# Patient Record
Sex: MALE | Race: BLACK OR AFRICAN AMERICAN | Employment: FULL TIME | URBAN - METROPOLITAN AREA
[De-identification: names, ages, dates, MRNs, and addresses within clinical notes are randomized per-mention and may not be internally consistent; named-entity substitution may affect disease eponyms.]

---

## 2017-02-15 ENCOUNTER — HOSPITAL ENCOUNTER (OUTPATIENT)
Dept: GENERAL RADIOLOGY | Age: 37
Discharge: HOME | End: 2017-02-15
Payer: COMMERCIAL

## 2017-02-15 DIAGNOSIS — Z00.00 ROUTINE GENERAL MEDICAL EXAMINATION AT A HEALTH CARE FACILITY: ICD-10-CM

## 2017-02-15 PROCEDURE — 71020 XR CHEST PA LAT: CPT

## 2017-03-07 ENCOUNTER — OFFICE VISIT (OUTPATIENT)
Dept: ORTHOPEDIC SURGERY | Age: 37
End: 2017-03-07

## 2017-03-07 VITALS
SYSTOLIC BLOOD PRESSURE: 141 MMHG | DIASTOLIC BLOOD PRESSURE: 99 MMHG | WEIGHT: 227 LBS | HEART RATE: 70 BPM | TEMPERATURE: 98 F

## 2017-03-07 DIAGNOSIS — M25.512 LEFT SHOULDER PAIN, UNSPECIFIED CHRONICITY: ICD-10-CM

## 2017-03-07 DIAGNOSIS — M75.122 COMPLETE ROTATOR CUFF TEAR OF LEFT SHOULDER: ICD-10-CM

## 2017-03-07 DIAGNOSIS — M19.012 PRIMARY OSTEOARTHRITIS OF LEFT SHOULDER: Primary | ICD-10-CM

## 2017-03-07 RX ORDER — MELOXICAM 15 MG/1
15 TABLET ORAL
Qty: 30 TAB | Refills: 1 | Status: SHIPPED | OUTPATIENT
Start: 2017-03-07 | End: 2018-11-06 | Stop reason: SDUPTHER

## 2017-03-07 RX ORDER — LOSARTAN POTASSIUM AND HYDROCHLOROTHIAZIDE 25; 100 MG/1; MG/1
1 TABLET ORAL DAILY
COMMUNITY
End: 2021-06-23

## 2017-03-07 RX ORDER — FAMOTIDINE 40 MG/1
40 TABLET, FILM COATED ORAL DAILY
COMMUNITY
End: 2021-06-23

## 2017-03-07 NOTE — PROGRESS NOTES
Selina Dobbins  1980   Chief Complaint   Patient presents with    Shoulder Pain     Left    Hand Pain     Left        HISTORY OF PRESENT ILLNESS  Selina Dobbins is a 31 y.o. male who presents today for evaluation of left shoulder pain. He rates his pain 10/10 today. He recalls wrestling several years ago and feeling a \"dislocating\" sensation. E states he has trouble sleeping at night due to the pain. He states when he throws a balls he feels his shoulder pop out. He reports having about 10 episodes of this. He works as a  and states he has to do a lot of lifting. Patient was referred to me by Dr. Yoni Gonzalez for further evaluation. No Known Allergies     History reviewed. No pertinent past medical history. Social History     Social History    Marital status: UNKNOWN     Spouse name: N/A    Number of children: N/A    Years of education: N/A     Occupational History    Not on file. Social History Main Topics    Smoking status: Heavy Tobacco Smoker     Packs/day: 0.50    Smokeless tobacco: Not on file    Alcohol use Not on file    Drug use: Not on file    Sexual activity: Not on file     Other Topics Concern    Not on file     Social History Narrative      History reviewed. No pertinent surgical history. History reviewed. No pertinent family history. Current Outpatient Prescriptions   Medication Sig    losartan-hydroCHLOROthiazide (HYZAAR) 100-25 mg per tablet Take 1 Tab by mouth daily.  famotidine (PEPCID) 40 mg tablet Take 40 mg by mouth daily.  ranitidine (ZANTAC) 150 mg tablet Take 150 mg by mouth two (2) times a day. No current facility-administered medications for this visit. REVIEW OF SYSTEM   Patient denies: Weight loss, Fever/Chills, HA, Visual changes, Fatigue, Chest pain, SOB, Abdominal pain, N/V/D/C, Blood in stool or urine, Edema. Pertinent positive as above in HPI.  All others were negative    PHYSICAL EXAM:   Visit Vitals    BP (!) 141/99 (BP 1 Location: Left arm, BP Patient Position: Sitting)    Pulse 70    Temp 98 °F (36.7 °C) (Oral)    Wt 227 lb (103 kg)     The patient is a well-developed, well-nourished male   in no acute distress. The patient is alert and oriented times three. The patient is alert and oriented times three. Mood and affect are normal.  LYMPHATIC: lymph nodes are not enlarged and are within normal limits  SKIN: normal in color and non tender to palpation. There are no bruises or abrasions noted. NEUROLOGICAL: Motor sensory exam is within normal limits. Reflexes are equal bilaterally. There is normal sensation to pinprick and light touch  MUSCULOSKELETAL:  Examination Left shoulder   Skin Intact   AC joint tenderness -   Biceps tenderness -   Forward flexion/Elevation    Active abduction    Glenohumeral abduction 90   External rotation ROM 60   Internal rotation ROM 30   Apprehension +   Chriss Relocation -   Jerk -   Load and Shift +   Obriens -   Speeds -   Impingement sign -   Supraspinatus/Empty Can -, 5/5   External Rotation Strength -, 5/5   Lift Off/Belly Press -, 5/5   Neurovascular Intact          IMAGING: XR of the left shoulder dated 3/7/17 was reviewed and read:  Small inferior spur in the humeral head with moderate degenerative changes in the glenohumeral joint. IMPRESSION:      ICD-10-CM ICD-9-CM    1. Primary osteoarthritis of left shoulder M19.012 715.11    2. Left shoulder pain, unspecified chronicity M25.512 719.41 AMB POC XRAY, SHOULDER; COMPLETE, 2+        PLAN: I discussed the treatment options with the patient. The challenge is that he has had some instability in the past, but he already had degenerative arthritis in the shoulder with an abnormality in the glenohumeral joint, so I am not sure whether we will be addressing the instability now. We will proceed with an MRI arthrogram to assess the left shoulder. He will also be given a prescription for Mobic.  I will see him back following completion of the MRI. Office note will be sent to the referring provider.   Follow-up Disposition: Not on File    Scribed by Doreen Franco [facility]) as dictated by MD Braxton Bernard M.D.   [facility]

## 2017-03-24 ENCOUNTER — HOSPITAL ENCOUNTER (OUTPATIENT)
Dept: MRI IMAGING | Age: 37
Discharge: HOME | End: 2017-03-24
Attending: ORTHOPAEDIC SURGERY
Payer: COMMERCIAL

## 2017-03-24 ENCOUNTER — HOSPITAL ENCOUNTER (OUTPATIENT)
Dept: GENERAL RADIOLOGY | Age: 37
Discharge: HOME | End: 2017-03-24
Attending: ORTHOPAEDIC SURGERY
Payer: COMMERCIAL

## 2017-03-24 DIAGNOSIS — M75.122 COMPLETE ROTATOR CUFF TEAR OF LEFT SHOULDER: ICD-10-CM

## 2017-03-24 PROCEDURE — 74011000250 HC RX REV CODE- 250: Performed by: ORTHOPAEDIC SURGERY

## 2017-03-24 PROCEDURE — 77002 NEEDLE LOCALIZATION BY XRAY: CPT

## 2017-03-24 PROCEDURE — 74011636320 HC RX REV CODE- 636/320: Performed by: ORTHOPAEDIC SURGERY

## 2017-03-24 PROCEDURE — A9579 GAD-BASE MR CONTRAST NOS,1ML: HCPCS | Performed by: ORTHOPAEDIC SURGERY

## 2017-03-24 PROCEDURE — 73222 MRI JOINT UPR EXTREM W/DYE: CPT

## 2017-03-24 RX ORDER — LIDOCAINE HYDROCHLORIDE 10 MG/ML
30 INJECTION, SOLUTION EPIDURAL; INFILTRATION; INTRACAUDAL; PERINEURAL
Status: COMPLETED | OUTPATIENT
Start: 2017-03-24 | End: 2017-03-24

## 2017-03-24 RX ORDER — SODIUM CHLORIDE 9 MG/ML
20 INJECTION INTRAMUSCULAR; INTRAVENOUS; SUBCUTANEOUS
Status: COMPLETED | OUTPATIENT
Start: 2017-03-24 | End: 2017-03-24

## 2017-03-24 RX ADMIN — LIDOCAINE HYDROCHLORIDE 6 ML: 10 INJECTION, SOLUTION EPIDURAL; INFILTRATION; INTRACAUDAL; PERINEURAL at 11:00

## 2017-03-24 RX ADMIN — GADOPENTETATE DIMEGLUMINE 0.1 ML: 469.01 INJECTION INTRAVENOUS at 11:00

## 2017-03-24 RX ADMIN — IOPAMIDOL 4 ML: 612 INJECTION, SOLUTION INTRATHECAL at 11:00

## 2017-03-24 RX ADMIN — SODIUM CHLORIDE 12 ML: 9 INJECTION, SOLUTION INTRAMUSCULAR; INTRAVENOUS; SUBCUTANEOUS at 11:00

## 2017-05-03 ENCOUNTER — OFFICE VISIT (OUTPATIENT)
Dept: ORTHOPEDIC SURGERY | Age: 37
End: 2017-05-03

## 2017-05-03 VITALS
SYSTOLIC BLOOD PRESSURE: 150 MMHG | BODY MASS INDEX: 33.62 KG/M2 | HEART RATE: 58 BPM | WEIGHT: 227 LBS | TEMPERATURE: 97.8 F | DIASTOLIC BLOOD PRESSURE: 85 MMHG | HEIGHT: 69 IN

## 2017-05-03 DIAGNOSIS — M21.822 HILL-SACHS LESION OF LEFT SHOULDER: ICD-10-CM

## 2017-05-03 DIAGNOSIS — M19.012 PRIMARY OSTEOARTHRITIS OF LEFT SHOULDER: Primary | ICD-10-CM

## 2017-05-03 DIAGNOSIS — M54.5 ACUTE LOW BACK PAIN, UNSPECIFIED BACK PAIN LATERALITY, WITH SCIATICA PRESENCE UNSPECIFIED: ICD-10-CM

## 2017-05-03 NOTE — PROGRESS NOTES
Karlee Ordaz  1980   Chief Complaint   Patient presents with    Follow-up     left shoulder, MRI results        HISTORY OF PRESENT ILLNESS  Karlee Ordaz is a 31 y.o. male who presents today for reevaluation of left shoulder pain and to review his MRI results. He rates his pain 0/10 today. He describes the pain as intermittent. He recalls wrestling several years ago and feeling a \"dislocating\" sensation. He states he has trouble sleeping at night due to the pain. He states when he throws balls he feels his shoulder pop out. He reports having about 10 episodes of this. He works as a  and states he has to do a lot of lifting. Patient denies any fever, chills, chest pain, shortness of breath or calf pain. There are no new illness or injuries to report since last seen in the office. There are no changes to medications, allergies, family or social history. PHYSICAL EXAM:   Visit Vitals    /85    Pulse (!) 58    Temp 97.8 °F (36.6 °C) (Oral)    Ht 5' 9\" (1.753 m)    Wt 227 lb (103 kg)    BMI 33.52 kg/m2     The patient is a well-developed, well-nourished male   in no acute distress. The patient is alert and oriented times three. The patient is alert and oriented times three. Mood and affect are normal.  LYMPHATIC: lymph nodes are not enlarged and are within normal limits  SKIN: normal in color and non tender to palpation. There are no bruises or abrasions noted. NEUROLOGICAL: Motor sensory exam is within normal limits. Reflexes are equal bilaterally. There is normal sensation to pinprick and light touch  MUSCULOSKELETAL:  unchanged    IMAGING:   MRI of the left shoulder with contrast dated 3/24/17 was reviewed and read: Impression:  1. Sequelae of remote anterior shoulder dislocation with mild Hill-Sachs lesion  and small displaced osseous Bankart. 2. Mild glenohumeral osteoarthrosis.   3. Undersurface fraying of the supraspinatus and infraspinatus tendon at the  Hill-Sachs lesion but otherwise no evidence for tendon tear. XR of the left shoulder dated 3/7/17 was reviewed and read:  Small inferior spur in the humeral head with moderate degenerative changes in the glenohumeral joint. IMPRESSION:      ICD-10-CM ICD-9-CM    1. Primary osteoarthritis of left shoulder M19.012 715.11    2. Hill-Sachs lesion of left shoulder M21.822 736.89    3. Acute low back pain, unspecified back pain laterality, with sciatica presence unspecified M54.5 724.2 REFERRAL TO SPINE SURGERY        PLAN: I discussed the results of the MRI arthrogram and the treatment options with the patient. MRI shows a mild Hill-Sachs lesion as well as OA. He no longer has instability symptoms and his pain is under control. So, any further surgical options are not on the table at this point. Patient had the additional complaint of back pain. He will be referred to the spine center. I instructed him to continue his activities with his shoulder as tolerated. He will return to me as needed. Follow-up Disposition: Not on File    Scribed by Aneudy Contreras [facility]) as dictated by Go Pacheco MD    I, Dr. Go Pacheco, confirm that all documentation is accurate.     Go Pacheco M.D.   [facility]

## 2017-05-12 ENCOUNTER — APPOINTMENT (OUTPATIENT)
Dept: GENERAL RADIOLOGY | Age: 37
End: 2017-05-12
Attending: PHYSICIAN ASSISTANT
Payer: COMMERCIAL

## 2017-05-12 ENCOUNTER — HOSPITAL ENCOUNTER (EMERGENCY)
Age: 37
Discharge: HOME | End: 2017-05-12
Attending: EMERGENCY MEDICINE
Payer: COMMERCIAL

## 2017-05-12 VITALS
HEIGHT: 69 IN | TEMPERATURE: 99.5 F | HEART RATE: 58 BPM | WEIGHT: 227 LBS | BODY MASS INDEX: 33.62 KG/M2 | SYSTOLIC BLOOD PRESSURE: 159 MMHG | OXYGEN SATURATION: 100 % | DIASTOLIC BLOOD PRESSURE: 87 MMHG | RESPIRATION RATE: 16 BRPM

## 2017-05-12 DIAGNOSIS — M54.32 SCIATICA OF LEFT SIDE: Primary | ICD-10-CM

## 2017-05-12 DIAGNOSIS — M51.36 DEGENERATIVE DISC DISEASE, LUMBAR: ICD-10-CM

## 2017-05-12 DIAGNOSIS — I10 ESSENTIAL HYPERTENSION: ICD-10-CM

## 2017-05-12 PROCEDURE — 72100 X-RAY EXAM L-S SPINE 2/3 VWS: CPT

## 2017-05-12 PROCEDURE — 74011636637 HC RX REV CODE- 636/637: Performed by: PHYSICIAN ASSISTANT

## 2017-05-12 PROCEDURE — 99283 EMERGENCY DEPT VISIT LOW MDM: CPT

## 2017-05-12 PROCEDURE — 74011250637 HC RX REV CODE- 250/637: Performed by: PHYSICIAN ASSISTANT

## 2017-05-12 RX ORDER — METHOCARBAMOL 500 MG/1
500 TABLET, FILM COATED ORAL 4 TIMES DAILY
Status: DISCONTINUED | OUTPATIENT
Start: 2017-05-12 | End: 2017-05-12

## 2017-05-12 RX ORDER — PREDNISONE 20 MG/1
60 TABLET ORAL
Status: COMPLETED | OUTPATIENT
Start: 2017-05-12 | End: 2017-05-12

## 2017-05-12 RX ORDER — METHOCARBAMOL 500 MG/1
500 TABLET, FILM COATED ORAL
Status: DISCONTINUED | OUTPATIENT
Start: 2017-05-12 | End: 2017-05-12 | Stop reason: HOSPADM

## 2017-05-12 RX ORDER — PREDNISONE 20 MG/1
TABLET ORAL
Qty: 12 TAB | Refills: 0 | Status: SHIPPED | OUTPATIENT
Start: 2017-05-12 | End: 2017-05-23 | Stop reason: ALTCHOICE

## 2017-05-12 RX ORDER — METHOCARBAMOL 500 MG/1
500 TABLET, FILM COATED ORAL 3 TIMES DAILY
Qty: 20 TAB | Refills: 0 | Status: SHIPPED | OUTPATIENT
Start: 2017-05-12 | End: 2021-06-23

## 2017-05-12 RX ORDER — HYDROCODONE BITARTRATE AND ACETAMINOPHEN 5; 325 MG/1; MG/1
1 TABLET ORAL
Status: COMPLETED | OUTPATIENT
Start: 2017-05-12 | End: 2017-05-12

## 2017-05-12 RX ADMIN — HYDROCODONE BITARTRATE AND ACETAMINOPHEN 1 TABLET: 5; 325 TABLET ORAL at 16:58

## 2017-05-12 RX ADMIN — METHOCARBAMOL 500 MG: 500 TABLET ORAL at 17:25

## 2017-05-12 RX ADMIN — PREDNISONE 60 MG: 20 TABLET ORAL at 17:25

## 2017-05-12 NOTE — LETTER
NOTIFICATION RETURN TO WORK / SCHOOL 
 
5/12/2017 7:11 PM 
 
Mr. Regino Mcardle [address] [place] [id number] To Whom It May Concern: 
 
Regino Mcardle is currently under the care of [facility]. He will return to work/school on: 5/17/17. If there are questions or concerns please have the patient contact our office.  
 
 
 
Sincerely, 
 
 
Yesi Han PA-C

## 2017-05-12 NOTE — ED PROVIDER NOTES
HPI Comments: 4:46 PM Tejinder Cook is a 31 y.o. male who presents to ED to be evaluated for lower back pain onset about three weeks ago. Pt explains he has been experiencing intermittent lower back pain for three weeks but it worsened today and began travelling down his L leg. States he was \"standing doing nothing\" when the pain became worse. Pt took an 800 mg Ibuprofen today with no relief. Pt works at a warehouse stocking inventory. Denies fever, chills, urinary symptoms, injury or fall. No other concerns at this time. The history is provided by the patient. No past medical history on file. No past surgical history on file. No family history on file. Social History     Social History    Marital status: UNKNOWN     Spouse name: N/A    Number of children: N/A    Years of education: N/A     Occupational History    Not on file. Social History Main Topics    Smoking status: Current Some Day Smoker     Packs/day: 0.50    Smokeless tobacco: Never Used    Alcohol use Yes    Drug use: No    Sexual activity: Not on file     Other Topics Concern    Not on file     Social History Narrative         ALLERGIES: Review of patient's allergies indicates no known allergies. Review of Systems   Constitutional: Negative for activity change, chills and fever. HENT: Negative for congestion and sore throat. Eyes: Negative. Respiratory: Negative for cough and shortness of breath. Cardiovascular: Negative for chest pain. Gastrointestinal: Negative for abdominal pain, constipation, diarrhea, nausea and vomiting. Genitourinary: Negative for difficulty urinating, dysuria, flank pain, frequency, hematuria and testicular pain. Musculoskeletal: Positive for back pain (Lower). Negative for neck pain and neck stiffness. Skin: Negative. Neurological: Negative for dizziness. Psychiatric/Behavioral: Negative for confusion. All other systems reviewed and are negative.       There were no vitals filed for this visit. Physical Exam   Constitutional: He is oriented to person, place, and time. He appears well-developed and well-nourished. No distress. HENT:   Head: Normocephalic and atraumatic. Right Ear: External ear normal.   Left Ear: External ear normal.   Nose: Nose normal.   Mouth/Throat: Oropharynx is clear and moist.   Eyes: Conjunctivae and EOM are normal.   Neck: Normal range of motion. Neck supple. Cardiovascular: Normal rate, regular rhythm, normal heart sounds and intact distal pulses. Pulmonary/Chest: Effort normal and breath sounds normal.   Abdominal: Soft. Bowel sounds are normal. There is no tenderness. Negative CVA tenderness bilaterally. Musculoskeletal: Normal range of motion. He exhibits tenderness (paraspinal lumbar, no midline tenderness, pt says movement during exam hurts his low back. sitting straight leg raise, positive on left, negative on right. ). He exhibits no edema or deformity. Neurological: He is alert and oriented to person, place, and time. He exhibits normal muscle tone. Coordination normal.   Reflexes symmetric 1+ bilateral patella. Skin: Skin is warm and dry. No rash noted. He is not diaphoretic. No erythema. No pallor. Psychiatric: He has a normal mood and affect. His behavior is normal.   Nursing note and vitals reviewed. MDM  Number of Diagnoses or Management Options  Degenerative disc disease, lumbar:   Essential hypertension:   Sciatica of left side:      Amount and/or Complexity of Data Reviewed  Tests in the radiology section of CPT®: ordered and reviewed (Viewed by me, no fx, severe DDD L5S1. )    Risk of Complications, Morbidity, and/or Mortality  Presenting problems: moderate  Diagnostic procedures: moderate  Management options: moderate  General comments: No cauda equina or other emergencies today, pt is ambulatory with some pain. Patient Progress  Patient progress: stable (Improved.  )    ED Course Procedures      ICD-10-CM ICD-9-CM   1. Sciatica of left side M54.32 724.3   2. Degenerative disc disease, lumbar M51.36 722.52   3. Essential hypertension I10 401.9     Plan: discharge to home, see ortho and pcp discuss MRI lumbar spine, robaxin and prednisone taper for now, return here for any concerns. Scribe Attestation:   Alysa Gonzalez, sudhaibing for and in the presence of Jennifer Hugo May 12, 2017 at 4:48 PM     Physician Assistant Attestation:   I personally performed the services described in this documentation, reviewed and edited the documentation which was dictated to the scribe in my presence, and it accurately records my words and actions.  Jennifer Hugo  May 12, 2017 at 4:48 PM    Signed by: Heidi Mahoney May 12, 2017 at 4:48 PM

## 2017-05-23 ENCOUNTER — OFFICE VISIT (OUTPATIENT)
Dept: ORTHOPEDIC SURGERY | Age: 37
End: 2017-05-23

## 2017-05-23 VITALS
BODY MASS INDEX: 33.62 KG/M2 | WEIGHT: 227 LBS | HEART RATE: 60 BPM | HEIGHT: 69 IN | TEMPERATURE: 98.2 F | DIASTOLIC BLOOD PRESSURE: 78 MMHG | SYSTOLIC BLOOD PRESSURE: 145 MMHG | RESPIRATION RATE: 20 BRPM

## 2017-05-23 DIAGNOSIS — M54.16 LUMBAR RADICULOPATHY: Primary | ICD-10-CM

## 2017-05-23 DIAGNOSIS — M79.18 MYOFASCIAL PAIN: ICD-10-CM

## 2017-05-23 RX ORDER — DICLOFENAC SODIUM 75 MG/1
75 TABLET, DELAYED RELEASE ORAL
Qty: 60 TAB | Refills: 5 | Status: SHIPPED | OUTPATIENT
Start: 2017-05-23 | End: 2018-11-06 | Stop reason: SDUPTHER

## 2017-05-23 NOTE — MR AVS SNAPSHOT
Visit Information Date & Time Provider Department Dept. Phone Encounter #  
 5/23/2017  8:45 AM Charlotte Badillo MD [facility] [phone] [id number] Follow-up Instructions Return in about 6 weeks (around 7/4/2017), or if symptoms worsen or fail to improve. Upcoming Health Maintenance Date Due Pneumococcal 19-64 Medium Risk (1 of 1 - PPSV23) 8/25/2001 DTaP/Tdap/Td series (1 - Tdap) 8/25/2003 INFLUENZA AGE 9 TO ADULT 8/1/2017 Allergies as of 5/23/2017  Review Complete On: 5/23/2017 By: Bozena Espino LPN No Known Allergies Current Immunizations  Never Reviewed No immunizations on file. Not reviewed this visit You Were Diagnosed With   
  
 Codes Comments Lumbar radiculopathy    -  Primary ICD-10-CM: M54.16 
ICD-9-CM: 724.4 Myofascial pain     ICD-10-CM: M79.1 ICD-9-CM: 729.1 Vitals BP Pulse Temp Resp Height(growth percentile) Weight(growth percentile) 145/78 60 98.2 °F (36.8 °C) (Oral) 20 5' 9\" (1.753 m) 227 lb (103 kg) BMI Smoking Status 33.52 kg/m2 Current Some Day Smoker BMI and BSA Data Body Mass Index Body Surface Area  
 33.52 kg/m 2 2.24 m 2 Preferred Pharmacy Pharmacy Name Phone [facility] [address] [place], [place] [address] Your Updated Medication List  
  
   
This list is accurate as of: 5/23/17  9:24 AM.  Always use your most recent med list.  
  
  
  
  
 diclofenac EC 75 mg EC tablet Commonly known as:  VOLTAREN Take 1 Tab by mouth two (2) times daily as needed. famotidine 40 mg tablet Commonly known as:  PEPCID Take 40 mg by mouth daily. losartan-hydroCHLOROthiazide 100-25 mg per tablet Commonly known as:  HYZAAR Take 1 Tab by mouth daily. meloxicam 15 mg tablet Commonly known as:  MOBIC Take 1 Tab by mouth daily (with breakfast). methocarbamol 500 mg tablet Commonly known as:  ROBAXIN Take 1 Tab by mouth three (3) times daily. raNITIdine 150 mg tablet Commonly known as:  ZANTAC Take 150 mg by mouth two (2) times a day. Prescriptions Sent to Pharmacy Refills  
 diclofenac EC (VOLTAREN) 75 mg EC tablet 5 Sig: Take 1 Tab by mouth two (2) times daily as needed. Class: Normal  
 Pharm[address]: [id number] [facility] [facility] #: [phone] Route: Oral  
  
We Performed the Following REFERRAL TO PHYSICAL THERAPY [RVP17 Custom] Comments:  
 eval and treat, Neto therapy, workplace conditioning; 
 
Patient has a lumbar radiculopathy Follow-up Instructions Return in about 6 weeks (around 7/4/2017), or if symptoms worsen or fail to improve. Referral Information Referral ID Referred By Referred To  
  
 [id number] JUAN Lew Not Available Visits Status Start Date End Date 1 New Request 5/23/17 5/23/18 If your referral has a status of pending review or denied, additional information will be sent to support the outcome of this decision. Patient Instructions Bulging Disc: Exercises Your Care Instructions Here are some examples of typical rehabilitation exercises for your condition. Start each exercise slowly. Ease off the exercise if you start to have pain. Your doctor or physical therapist will tell you when you can start these exercises and which ones will work best for you. How to do the exercises Note: These exercises can help you move easier and feel better. But when you first start doing them, you may have more pain in your back. This is normal. But it is important to pay close attention to your pain during and after each exercise. · Keep doing these exercises if your pain stays the same or moves from your leg and buttock more toward the middle of your spine.  Pain moving out of your leg and buttock is a good sign. · Stop doing these exercises if your pain gets worse in your leg and buttock. Stop if you start to have pain in your leg and buttock that you didn't have before. Be sure to do these exercises in the order they appear. Note how your pain changes before you move to the next one. If your pain is much worse right after exercise and stays worse the next day, do not do any of these exercises. 1. Rest on belly 1. Lie on your stomach, face down, with your head turned to the side. Place your arms beside your body. If this bothers your neck, place your hands, one on top of the other, underneath your forehead. This will help support your head and neck. 2. Try to relax your lower back muscles as much as you can. 3. Continue to lie on your stomach for 2 minutes. 4. If your pain spreads down your leg or increases down your leg, stop this exercise and do not do the next exercises. 2. Press-up 1. Lie on your stomach, face down. Keep your elbows tucked into your sides and under your shoulders. 2. Press your elbows down into the floor to raise your upper back. As you do this, relax your stomach muscles. Allow your back to arch without using your back muscles. Let your low back relax completely as you arch up. 3. Hold this position for 2 minutes. 4. Repeat 2 to 4 times. 5. If your pain spreads down your leg or increases down your leg, stop this exercise and do not do the next exercises. 3. Full press-up 1. Lie on your stomach, face down. Keep your elbows tucked into your sides and under your shoulders. 2. Straighten your elbows, and push your upper body up as far as you can. Allow your lower back to sag. Keep your hips, pelvis, and legs relaxed. 3. Hold this position for 5 seconds, and then relax. 4. Repeat 10 times. Each time, try to raise your upper body a little higher and hold your arms a bit straighter. 5. If your pain spreads down your leg or gets worse down your leg, stop this exercise and do not move to the next exercise. 6. If you can't do this exercise, you may instead try the backward bend exercise that follows. 4. Backward bend · Stand with your feet hip-width apart. Your toes should point forward. Do not lock your knees. · Place your hands in the small of your back. · Bend backward as far as you can, keeping your knees straight. Hold this position for 2 to 3 seconds. Then return to your starting position. · Repeat 2 to 4 times. Each time, try to bend backward a little farther, until you bend backward as far as you can. · If your pain spreads down your leg or increases down your leg, stop this exercise. Follow-up care is a key part of your treatment and safety. Be sure to make and go to all appointments, and call your doctor if you are having problems. It's also a good idea to know your test results and keep a list of the medicines you take. Where can you learn more? Go to http://chuy-jean.info/ Enter [phone] in the search box to learn more about \"Herniated Disc: Exercises. \" 
© 3921-0684 Healthwise, Incorporated. Care instructions adapted under license by [employer] (which disclaims liability or warranty for this information). This care instruction is for use with your licensed healthcare professional. If you have questions about a medical condition or this instruction, always ask your healthcare professional. [place] [address] any warranty or liability for your use of this information. Content Version: 17.5.808424; Current as of: May 22, 2015 Introducing [facility]! Deamargaret Reyes: Thank you for requesting a [employer] account. Our records indicate that you already have an active [employer] account. You can access your account anytime at https://[employer]. [employer] Did you know that you can access your hospital and ER discharge instructions at any time in [employer]? You can also review all of your test results from your hospital stay or ER visit. Additional Information If you have questions, please visit the Frequently Asked Questions section of the [employer] website at [email or web address] Remember, [employer] is NOT to be used for urgent needs. For medical emergencies, dial 911. Now available from your iPhone and Android! Please provide this summary of care documentation to your next provider. Your primary care clinician is listed as [address]. If you have any questions after today's visit, please call [phone].

## 2017-05-23 NOTE — LETTER
NOTIFICATION RETURN TO WORK 
 
5/23/2017 9:11 AM 
 
Mr. Sangeeta Easley [address] [place] [id number] To Whom It May Concern: 
 
Sangeeta Easley is currently under the care of [facility]. He was seen today on 5/23/2017. Mr. Hortensia Canela will return to work on medium duty with the following restriction: no lifting more than 30 lbs. He will return in 6 weeks for reevaluation. If there are questions or concerns please have the patient contact our office.  
 
 
 
Sincerely, 
 
 
Chery Milligan MD

## 2017-05-23 NOTE — PATIENT INSTRUCTIONS
Bulging Disc: Exercises  Your Care Instructions  Here are some examples of typical rehabilitation exercises for your condition. Start each exercise slowly. Ease off the exercise if you start to have pain. Your doctor or physical therapist will tell you when you can start these exercises and which ones will work best for you. How to do the exercises  Note: These exercises can help you move easier and feel better. But when you first start doing them, you may have more pain in your back. This is normal. But it is important to pay close attention to your pain during and after each exercise. · Keep doing these exercises if your pain stays the same or moves from your leg and buttock more toward the middle of your spine. Pain moving out of your leg and buttock is a good sign. · Stop doing these exercises if your pain gets worse in your leg and buttock. Stop if you start to have pain in your leg and buttock that you didn't have before. Be sure to do these exercises in the order they appear. Note how your pain changes before you move to the next one. If your pain is much worse right after exercise and stays worse the next day, do not do any of these exercises. 1. Rest on belly    1. Lie on your stomach, face down, with your head turned to the side. Place your arms beside your body. If this bothers your neck, place your hands, one on top of the other, underneath your forehead. This will help support your head and neck. 2. Try to relax your lower back muscles as much as you can. 3. Continue to lie on your stomach for 2 minutes. 4. If your pain spreads down your leg or increases down your leg, stop this exercise and do not do the next exercises. 2. Press-up    1. Lie on your stomach, face down. Keep your elbows tucked into your sides and under your shoulders. 2. Press your elbows down into the floor to raise your upper back. As you do this, relax your stomach muscles.  Allow your back to arch without using your back muscles. Let your low back relax completely as you arch up. 3. Hold this position for 2 minutes. 4. Repeat 2 to 4 times. 5. If your pain spreads down your leg or increases down your leg, stop this exercise and do not do the next exercises. 3. Full press-up    1. Lie on your stomach, face down. Keep your elbows tucked into your sides and under your shoulders. 2. Straighten your elbows, and push your upper body up as far as you can. Allow your lower back to sag. Keep your hips, pelvis, and legs relaxed. 3. Hold this position for 5 seconds, and then relax. 4. Repeat 10 times. Each time, try to raise your upper body a little higher and hold your arms a bit straighter. 5. If your pain spreads down your leg or gets worse down your leg, stop this exercise and do not move to the next exercise. 6. If you can't do this exercise, you may instead try the backward bend exercise that follows. 4. Backward bend    · Stand with your feet hip-width apart. Your toes should point forward. Do not lock your knees. · Place your hands in the small of your back. · Bend backward as far as you can, keeping your knees straight. Hold this position for 2 to 3 seconds. Then return to your starting position. · Repeat 2 to 4 times. Each time, try to bend backward a little farther, until you bend backward as far as you can. · If your pain spreads down your leg or increases down your leg, stop this exercise. Follow-up care is a key part of your treatment and safety. Be sure to make and go to all appointments, and call your doctor if you are having problems. It's also a good idea to know your test results and keep a list of the medicines you take. Where can you learn more? Go to http://chuy-jean.info/  Enter Z594 in the search box to learn more about \"Herniated Disc: Exercises. \"  © 4154-6309 [employer]wise, Incorporated.  Care instructions adapted under license by [employer] (which disclaims liability or warranty for this information). This care instruction is for use with your licensed healthcare professional. If you have questions about a medical condition or this instruction, always ask your healthcare professional. [address] any warranty or liability for your use of this information.   Content Version: 26.1.096835; Current as of: May 22, 2015

## 2017-05-23 NOTE — PROGRESS NOTES
[address]  [address], [address]  [place], [address] [place] [address] Street  Phone: [phone]  Fax: [phone]        Carolyn Kerry  : 1980  PCP: Herrera Billings MD  2017    NEW PATIENT      ASSESSMENT AND PLAN     Carol Vera comes in to the office today c/o 10/10 aching lumbar pain that radiates down his lateral left leg to the bottom of his foot x 1 month. He took Prednisone 3x 60 mg tablets once with good relief and states that his radiating left leg pain subsided after it. His pain was likely due to a left L5 or S1 radiculopathy which was relieved with the Prednisone. He was referred to Alek Forget therapy with workplace conditioning. We discussed another Prednisone dose pack should his pain worsen. Pt was prescribed diclofenac 75mg BID prn. The risks, benefits, and potential side effects of this medication were discussed. He was given a work note to place him on medium duty with a 30 lb weight restriction. Pt will f/u in 6 weeks or sooner if needed. Cristhian Bartholomew was seen today for back pain. Diagnoses and all orders for this visit:    Lumbar radiculopathy  -     REFERRAL TO PHYSICAL THERAPY    Myofascial pain  -     diclofenac EC (VOLTAREN) 75 mg EC tablet; Take 1 Tab by mouth two (2) times daily as needed. Follow-up Disposition:  Return in about 6 weeks (around 2017), or if symptoms worsen or fail to improve. CHIEF COMPLAINT  Carol Vera is seen today in consultation at the request of Herrera Billings MD for complaints of lumbar pain. HISTORY OF PRESENT ILLNESS  Carol Vera is a 31 y.o. male c/o 10/10 aching lumbar pain that radiates down his lateral left leg to the bottom of his foot x 1 month. He took Prednisone 3x 60 mg tablets once with good relief and states that his radiating left leg pain subsided after it. All forms of movement exacerbate his pain. Pt denies any fevers, chills, nausea, vomiting.  Pt denies any chest pain and shortness of breath. Pt denies any ear, nose, and throat problems. Pt denies any fecal or urinary incontinence. He smokes occasionally. He works in a warehouse (heavy lifting). PAST MEDICAL HISTORY   No past medical history on file. No past surgical history on file. MEDICATIONS      Current Outpatient Prescriptions   Medication Sig Dispense Refill    diclofenac EC (VOLTAREN) 75 mg EC tablet Take 1 Tab by mouth two (2) times daily as needed. 60 Tab 5    methocarbamol (ROBAXIN) 500 mg tablet Take 1 Tab by mouth three (3) times daily. 20 Tab 0    losartan-hydroCHLOROthiazide (HYZAAR) 100-25 mg per tablet Take 1 Tab by mouth daily.  famotidine (PEPCID) 40 mg tablet Take 40 mg by mouth daily.  ranitidine (ZANTAC) 150 mg tablet Take 150 mg by mouth two (2) times a day.  meloxicam (MOBIC) 15 mg tablet Take 1 Tab by mouth daily (with breakfast). 30 Tab 1       ALLERGIES  No Known Allergies       SOCIAL HISTORY    Social History     Social History    Marital status: UNKNOWN     Spouse name: N/A    Number of children: N/A    Years of education: N/A     Social History Main Topics    Smoking status: Current Some Day Smoker     Packs/day: 0.50    Smokeless tobacco: Never Used    Alcohol use Yes    Drug use: No    Sexual activity: Not Asked     Other Topics Concern    None     Social History Narrative       FAMILY HISTORY  No family history on file. REVIEW OF SYSTEMS  Review of Systems   Constitutional: Negative for chills, diaphoresis, fever, malaise/fatigue and weight loss. Respiratory: Negative for shortness of breath. Cardiovascular: Negative for chest pain and leg swelling. Gastrointestinal: Negative for constipation, nausea and vomiting. Neurological: Negative for dizziness, tingling, seizures, loss of consciousness and headaches. Psychiatric/Behavioral: The patient does not have insomnia.           PHYSICAL EXAMINATION  Visit Vitals    /78    Pulse 60    Temp 98.2 °F (36.8 °C) (Oral)    Resp 20    Ht 5' 9\" (1.753 m)    Wt 227 lb (103 kg)    BMI 33.52 kg/m2         Pain Assessment  5/3/2017   Location of Pain Shoulder   Location Modifiers Left   Severity of Pain 0   Quality of Pain -   Duration of Pain -   Frequency of Pain -   Aggravating Factors -   Limiting Behavior -   Relieving Factors -         Constitutional:  Well developed, well nourished, in no acute distress. Psychiatric: Affect and mood are appropriate. HEENT: Normocephalic, atraumatic. Extraocular movements intact. Integumentary: No rashes or abrasions noted on exposed areas. Cardiovascular: Regular rate and rhythm. Pulmonary: Clear to auscultation bilaterally. SPINE/MUSCULOSKELETAL EXAM    Cervical spine:  Neck is midline. Normal muscle tone. No focal atrophy is noted. ROM pain free. Shoulder ROM intact. Mild tenderness to palpation. Negative Spurling's sign. Negative Tinel's sign. Negative Kirby's sign. Sensation in the bilateral arms grossly intact to light touch. Lumbar spine:  No rash, ecchymosis, or gross obliquity. No fasciculations. No focal atrophy is noted. No pain with hip ROM. Full range of motion. Tenderness to palpation L>R. No tenderness to palpation at the sciatic notch. SI joints non-tender. Trochanters non tender. Pain with back extension > flexion. Sensation in the bilateral legs grossly intact to light touch. MOTOR:      Biceps  Triceps Deltoids Wrist Ext Wrist Flex Hand Intrin   Right 5/5 5/5 5/5 5/5 5/5 5/5   Left 5/5 5/5 5/5 5/5 5/5 5/5             Hip Flex  Quads Hamstrings Ankle DF EHL Ankle PF   Right 5/5 5/5 5/5 5/5 5/5 5/5   Left 5/5 5/5 5/5 5/5 5/5 5/5     DTRs are 2+ biceps, triceps, brachioradialis, patella, and Achilles. Negative Straight Leg raise. Squat not tested. No difficulty with tandem gait. Ambulation without assistive device. FWB.       RADIOGRAPHS  Lumbar XR images taken on 5/12/2017 personally reviewed with patient:  FINDINGS:     The study shows mild dextroscoliosis in thoracolumbar spine.     Lumbar vertebral bodies are of normal heights and in normal alignment is. There  is no evidence of fracture or compression in lumbar spine.     The study shows minimal degenerative disc disease at L3/L4 and L2/L3 levels. At  L4/L5 and L5-S1 levels there are mild facet osteoarthritic changes.     IMPRESSION  IMPRESSION:     No evidence of fracture, vertebral body compression or malalignment in lumbar  spine or in visualized sacrum.     Minimal degenerative disc disease in lumbar spine.     Mild facet osteoarthritic changes in lower lumbar spine and lumbosacral  junction.  reviewed    Mr. Charis Arredondo has a reminder for a \"due or due soon\" health maintenance. I have asked that he contact his primary care provider for follow-up on this health maintenance. This plan was reviewed with the patient and patient agrees. All questions were answered. More than half of this visit today was spent on counseling. Written by Shantel Trujillo, as dictated by Dr. Elisabeth Coy. I, Dr. Elisabeth Coy, confirm that all documentation is accurate.

## 2017-05-25 ENCOUNTER — HOSPITAL ENCOUNTER (OUTPATIENT)
Dept: PHYSICAL THERAPY | Age: 37
Discharge: HOME | End: 2017-05-25
Payer: COMMERCIAL

## 2017-05-25 PROCEDURE — 97162 PT EVAL MOD COMPLEX 30 MIN: CPT

## 2017-05-25 PROCEDURE — 97110 THERAPEUTIC EXERCISES: CPT

## 2017-05-25 NOTE — PROGRESS NOTES
[facility]  [address] [facility], [address],[place]  [phone] fax    Plan of Care/ Statement of Necessity for Physical Therapy Services    Patient name: Maxime Crespo Start of Care: 2017   Referral source: Mike Parra MD : 1980    Medical Diagnosis: Radiculopathy, lumbar region [M54.16]   Onset Date:approx 1 month ago    Treatment Diagnosis: decrease tolerance to ADLS and activities due to LBP L and L LE S/S   Prior Hospitalization: see medical history Provider#: [id number]   Medications: Verified on Patient summary List    Comorbidities: HTN   Prior Level of Function:  I all areas, No AD, works full time, needs to tolerate household chores     The Plan of Care and following information is based on the information from the initial evaluation. Assessment/ key information: 32 YO male diagnosed as above and with S/S consistent with above diagnosis presents to skilled outpatient PT. CCO burning, cramping, sharp pains in the L LB. Additionally he has had S/S down the L LE with difficulty walking as noted int mechanism of injury section. Pain overall improved since receiving Prednisone. Pain today is 5/10 and chiefly in the L LS region. Better with lying down mainly R SL, Worse with walking , bending and driving,upright chair when sitting.    Previous Treatment/Compliance: ER , x-rays, prednisone, and spine center  Pain 5, FOTO 48, no AD use, mildly guarded gait pattern with decrease pelvic mobility, - lateral shift, Lordosis WNL, Trunk FF 21cm from floor, E 20 degrees ,Pain radiates to the L upper buttock with 1 rep, NWAR with MITA,  SB R/L 48cm/53cm ROT  B 50%, + L scuatic nerve tension and + L piriformis tension test.   Patient demonstrates the potential to make gains with improved ROM, strength, endurance/activity tolerance, functional FOTO survey score and all within a reasonable time frame so as to increase their functional independence with ADLs and activities for carryover to Improve quality of life and tolerance to work demands and household chores. Patient requires skilled Physical Therapy so as to monitor their response to and modify their treatment plan accordingly. Patient appears to be an appropriate candidate for skilled outpatient Physical Therapy.     Evaluation Complexity History MEDIUM  Complexity : 1-2 comorbidities / personal factors will impact the outcome/ POC ; Examination HIGH Complexity : 4+ Standardized tests and measures addressing body structure, function, activity limitation and / or participation in recreation  ;Presentation MEDIUM Complexity : Evolving with changing characteristics  ; Clinical Decision Making MEDIUM Complexity : FOTO score of 26-74  Overall Complexity Rating: MEDIUM  Problem List: pain affecting function, decrease ROM, decrease strength, decrease ADL/ functional abilitiies, decrease activity tolerance, decrease flexibility/ joint mobility and other FOTO 48   Treatment Plan may include any combination of the following: Therapeutic exercise, Therapeutic activities, Neuromuscular re-education, Physical agent/modality, Manual therapy and Patient education  Patient / Family readiness to learn indicated by: asking questions, trying to perform skills and interest  Persons(s) to be included in education: patient (P)  Barriers to Learning/Limitations: None  Patient Goal (s): Get rid of the pain  Patient Self Reported Health Status: good  Rehabilitation Potential: good    Short Term Goals: To be accomplished in 5 treatments:   1 patient will have established and be independent with HEP to aid with progression of skilled PT program   EVAL issued   CURRENT   2 patient will have FOTO improved to 57 to show increase tolerance to work demands   EVAL 48   CURRENT   3 patient will have pain 3/10 to aid with increase tolerance to ADLs and activities   EVAL 5   CURRENT    Long Term Goals:  To be accomplished in 10 treatments:   1 patient will have FOTO improved to 67 to show increase tolerance to work demands   EVAL 48   CURRENT   2 patient will have pain 1-2/10 to aid with increase tolerance to ADLs and activities   EVAL 5   CURRENT   3 patient will report 50% overall improvement to aid with increase tolerance to ADLS and activities   EVAL   CURRENT      Frequency / Duration: Patient to be seen 2-3 times per week for 10 treatments. Patient/ Caregiver education and instruction: Diagnosis, prognosis, self care, activity modification and exercises   [x]  Plan of care has been reviewed with CHIQUI Connor, PT 5/25/2017 3:34 PM  ________________________________________________________________________    I certify that the above Therapy Services are being furnished while the patient is under my care. I agree with the treatment plan and certify that this therapy is necessary.     [de-identified] Signature:____________________  Date:____________Time: _________    Please sign and return to [facility] - ELENA COOPER [address], [address] [place] [address],[facility] [address]  [phone] fax

## 2017-05-25 NOTE — PROGRESS NOTES
PT DAILY TREATMENT NOTE/LUMBAR EVAL 3-16    Patient Name: Sigrid Kwong  Date:2017  : 1980  [x]  Patient  Verified  Payor: BLUE JAMESON / Plan: Antonia Tello [id number] PPO / Product Type: PPO /    In time:303  Out time:340  Total Treatment Time (min): 37  Total Timed Codes (min): 8  1:1 Treatment Time (MC only): 8   Visit #: 1 of 10    Treatment Area: Radiculopathy, lumbar region [M54.16]  SUBJECTIVE  Pain Level (0-10 scale): 5  []constant [x]intermittent [x]improving since ER visit 17 []worsening []no change since onset  Better with lying down mainly R SL, Worse with walking , bending and driving,upright chair when sitting   Any medication changes, allergies to medications, adverse drug reactions, diagnosis change, or new procedure performed?: [x] No    [] Yes (see summary sheet for update)  Subjective functional status/changes:     PLOF:I  Limitations to PLOF: pain, burning and cramping mostly L LB   Mechanism of Injury:onset a month ago, insideous onset of  Central lower back pain noticeable in the morning or with bending and driving. He reports exacerbation of S/S 17 with excrutiating pain and difficulty walking that sent him to the ER  Current symptoms/Complaints: 30 YO male diagnosed as above and with S/S consistent with above diagnosis presents to skilled outpatient PT. CCO burning, cramping, sharp pains in the L LB. Additionally he has had S/S down the L LE with difficulty walking as noted int mechanism of injury section. Pain overall improved since receiving Prednisone. Pain today is 5/10 and chiefly in the L LS region.     Previous Treatment/Compliance: ER , x-rays, prednisone, and spine center  PMHx/Surgical Hx: HTN  Work Hx: works full time, warehouse work, walking, lifting, standing  Living Situation: lives in a apartment, second floor, no elevator,lives with girlfriend  Pt Goals:  Get rid of the pain  Barriers: [x]pain []financial []time []transportation []other  Motivation:high  Substance use: []Alcohol []Tobacco []other:   FABQ Score: []low []elevate  Cognition: A & O x 4    Other:    OBJECTIVE/EXAMINATION  Domestic Life: work, household chores  Activity/Recreational Limitations: pain  Mobility: I   Self Care: I but with pain, stiffness limits his performance with this        Modality rationale:     Min Type Additional Details    [] Estim:  []Unatt       []IFC  []Premod                        []Other:  []w/ice   []w/heat  Position:  Location:    [] Estim: []Att    []TENS instruct  []NMES                    []Other:  []w/US   []w/ice   []w/heat  Position:  Location:    []  Traction: [] Cervical       []Lumbar                       [] Prone          []Supine                       []Intermittent   []Continuous Lbs:  [] before manual  [] after manual    []  Ultrasound: []Continuous   [] Pulsed                           []1MHz   []3MHz Location:  W/cm2:    []  Iontophoresis with dexamethasone         Location: [] Take home patch   [] In clinic    []  Ice     []  heat  []  Ice massage  []  Laser   []  Anodyne Position:  Location:    []  Laser with stim  []  Other: Position:  Location:    []  Vasopneumatic Device Pressure:       [] lo [] med [] hi   Temperature: [] lo [] med [] hi   [] Skin assessment post-treatment:  []intact []redness- no adverse reaction    []redness - adverse reaction:     29 min [x]Eval                  []Re-Eval       8 min Therapeutic Exercise:  [x] See flow sheet :   Rationale: increase ROM, increase strength and improve coordination to improve the patients ability to aid with increase tolerance to ADLS and activities     min Therapeutic Activity:  []  See flow sheet :   Rationale:   to improve the patients ability to       min Neuromuscular Re-education:  []  See flow sheet :   Rationale:   to improve the patients ability to      min Manual Therapy:     Rationale:  to      min Gait Training:  ___ feet with ___ device on level surfaces with ___ level of assist   Rationale: With   [] TE   [] TA   [] neuro   [] other: Patient Education: [x] Review HEP    [] Progressed/Changed HEP based on:   [] positioning   [] body mechanics   [] transfers   [] heat/ice application    [] other:      Other Objective/Functional Measures:      Physical Therapy Evaluation - Lumbar Spine ([facility])    SUBJECTIVE  Chief Complaint:    Mechanism of injury:    Symptoms:  Aggravated by:   [] Bending [] Sitting [] Standing [] Walking   [] Moving [] Cough [] Sneeze [] Valsalva   [] AM  [] PM  Lying:  [] sup   [] pro   [] sidelying   [] Other:     Eased by:    [] Bending [] Sitting [] Standing [] Walking   [] Moving [] AM  [] PM  Lying: [] sup  [] pro  [] sidelying   [] Other:     General Health:  Red Flags Indicated? [] Yes    [] No  [] Yes [] No Recent weight change (If yes, due to dieting?  [] Yes  [] No)   [] Yes [] No Weakness in legs during walking  [] Yes [] No Unremitting pain at night  [] Yes [] No Abdominal pain or problems  [] Yes [] No Rectal bleeding  [] Yes [] No Feet more cold or painful in cold weather  [] Yes [] No Menstrual irregularities  [] Yes [] No Blood or pain with urination  [] Yes [] No Dysfunction of bowel or bladder  [] Yes [] No Recent illness within past 3 weeks (i.e, cold, flu)  [] Yes [] No Numbness/tingling in buttock/genitalia region    Past History/Treatments:     Diagnostic Tests: [] Lab work [x] X-rays    [] CT [] MRI     [] Other:  Results:    Functional Status  Prior level of function:  Present functional limitations:  What position do you sleep in?:    OBJECTIVE  Posture:  Lateral Shift: [x] R    [x] L     [] +  [x] -  Kyphosis: [] Increased [] Decreased   []  WNL  Lordosis:  [] Increased [] Decreased   [x] WNL  Pelvic symmetry: [] WNL    [] Other:    Gait:  [] Normal     [] Abnormal:no AD use, mildly guarded with decrease pelvic rotation    Active Movements: [] N/A   [] Too acute   [] Other:  ROM   AROM % PROM Comments:pain, area Forward flexion 40-60 21cm     Extension 20-30 20 degrees   Pain radiates to the L upper buttock with 1 rep, NWAR with MITA   SB right 20-30 48cm     SB left 20-30 53cm     Rotation right 5-10 50%   Pain, decreased ease   Rotation left 5-10 50%       Repeated Movements   Effects on present pain: produces (PA), abolishes (A), increases (incr), decreases (decr), centralizes (C), peripheral (PH), no effect (NE)   Pre-Test Sx Flexion Repeated Flexion Extension Repeated Extension Repeated SBL Repeated SBR   Sitting          Standing          Lying      N/A N/A   Comments:  Side Glide:  Sustained passive positioning test:    Neuro Screen [] WNL  Myotome/Dermatome/Reflexes:  Comments:    Dural Mobility:  SLR Sitting: [] R    [] L    [] +    [] -  @ (degrees):           Supine: [] R    [x] L    [x] +    [] -  @ (degrees):   Slump Test: [] R    [] L    [] +    [] -  @ (degrees):   Prone Knee Bend: [] R    [] L    [] +    [] -     Palpation  [] Min  [x] Mod  [] Severe    Location: L piriformis TTP, STC  [] Min  [x] Mod  [] Severe    Location:TTP STC L LS  [] Min  [] Mod  [] Severe    Location:    Strength   L(0-5) R (0-5) N/T   Hip Flexion (L1,2) 5 5 []   Knee Extension (L3,4) 5 5 []   Ankle Dorsiflexion (L4) 5 5 []   Great Toe Extension (L5)   []   Ankle Plantarflexion (S1)   []   Knee Flexion (S1,2) 5 5 []   Upper Abdominals   []   Lower Abdominals   []   Paraspinals   []   Back Rotators   []   Gluteus Rubens   []   Other   []     Special Tests  Lumbar:  Lumb.  Compression: [] Pos  [] Neg               Lumbar Distraction:   [] Pos  [] Neg    Quadrant:  [] Pos  [] Neg   [] Flex  [] Ext    Sacroilliac:  Gaenslen's: [] R    [] L    [] +    [] -     Compression: [] +    [] -     Gapping:  [] +    [] -     Thigh Thrust: [] R    [] L    [] +    [] -     Leg Length: [] +    [] -   Position:    Crests:    ASIS:    PSIS:    Sacral Sulcus:    Mobility: Standing flex:     Sitting flex:     Supine to sit:     Prone knee bend: Hip: Kahlil Luther:  [] R    [] L    [] +    [] -     Scour:  [] R    [] L    [] +    [] -     Piriformis: [] R    [x] L    [x] +    [] -          Deficits: Anirudh's: [] R    [] L    [] +    [] -     Vinod Amel: [] R    [] L    [] +    [] -     Hamstrings 90/90:    Gastrocsoleus (to neutral): Right: Left:       Global Muscular Weakness:  Abdominals:  Quadratus Lumborum:  Paraspinals: Other:    Other tests/comments:       Pain Level (0-10 scale) post treatment: 5    ASSESSMENT/Changes in Function: Patient demonstrates the potential to make gains with improved ROM, strength, endurance/activity tolerance, functional FOTO survey score and all within a reasonable time frame so as to increase their functional independence with ADLs and activities for carryover to  Improve quality of life and tolerance to work demands and household chores. Patient requires skilled Physical Therapy so as to monitor their response to and modify their treatment plan accordingly. Patient appears to be an appropriate candidate for skilled outpatient Physical Therapy. Patient will continue to benefit from skilled PT services to modify and progress therapeutic interventions, address functional mobility deficits, address ROM deficits, address strength deficits, analyze and address soft tissue restrictions, analyze and cue movement patterns, analyze and modify body mechanics/ergonomics, assess and modify postural abnormalities and instruct in home and community integration to attain remaining goals.      [x]  See Plan of Care  []  See progress note/recertification  []  See Discharge Summary         Progress towards goals / Updated goals:       PLAN  [x]  Upgrade activities as tolerated     [x]  Continue plan of care  []  Update interventions per flow sheet       []  Discharge due to:_  []  Other:_      Caio Lockett, PT 5/25/2017  3:02 PM

## 2017-05-26 ENCOUNTER — HOSPITAL ENCOUNTER (OUTPATIENT)
Dept: PHYSICAL THERAPY | Age: 37
Discharge: HOME | End: 2017-05-26
Payer: COMMERCIAL

## 2017-05-26 PROCEDURE — 97035 APP MDLTY 1+ULTRASOUND EA 15: CPT

## 2017-05-26 PROCEDURE — 97110 THERAPEUTIC EXERCISES: CPT

## 2017-05-26 NOTE — PROGRESS NOTES
PT DAILY TREATMENT NOTE     Patient Name: Chris Rodarte  Date:2017  : 1980  [x]  Patient  Verified  Payor: BLUE CROSS / Plan: Antonia Tello [id number] PPO / Product Type: PPO /    In time:226  Out time:308  Total Treatment Time (min):41  Visit #: 2 of 10    Treatment Area: Radiculopathy, lumbar region [M54.16]    SUBJECTIVE  Pain Level (0-10 scale): 0  Any medication changes, allergies to medications, adverse drug reactions, diagnosis change, or new procedure performed?: [x] No    [] Yes (see summary sheet for update)  Subjective functional status/changes:   [] No changes reported  It's not so much a pain but a burning.      OBJECTIVE    Modality rationale: decrease inflammation and increase tissue extensibility to improve the patients ability to aid with increase tolerance to ADLs and activities   Min Type Additional Details    [] Estim:  []Unatt       []IFC  []Premod                        []Other:  []w/ice   []w/heat  Position:  Location:    [] Estim: []Att    []TENS instruct  []NMES                    []Other:  []w/US   []w/ice   []w/heat  Position:  Location:    []  Traction: [] Cervical       []Lumbar                       [] Prone          []Supine                       []Intermittent   []Continuous Lbs:  [] before manual  [] after manual   8 [x]  Ultrasound: [x]Continuous   [] Pulsed                           [x]1MHz   []3MHz W/cm2:1.3  Location:L LS/SIJ, Superior glut    []  Iontophoresis with dexamethasone         Location: [] Take home patch   [] In clinic   10 [x]  Ice post    []  heat  []  Ice massage  []  Laser   []  Anodyne Position:prone  Location:B LS    []  Laser with stim  []  Other:  Position:  Location:    []  Vasopneumatic Device Pressure:       [] lo [] med [] hi   Temperature: [] lo [] med [] hi   [] Skin assessment post-treatment:  []intact []redness- no adverse reaction    []redness - adverse reaction:       min []Eval                  []Re-Eval       23 min Therapeutic Exercise:  [x] See flow sheet :   Rationale: increase ROM, increase strength and improve coordination to improve the patients ability to aid with increase tolerance to ADLs and activities     min Therapeutic Activity:  []  See flow sheet :   Rationale:   to improve the patients ability to     min Neuromuscular Re-education:  []  See flow sheet :   Rationale:   to improve the patients ability to      min Manual Therapy:     Rationale:  to      min Gait Training:  ___ feet with ___ device on level surfaces with ___ level of assist   Rationale: With   [] TE   [] TA   [] neuro   [] other: Patient Education: [x] Review HEP    [] Progressed/Changed HEP based on:   [] positioning   [] body mechanics   [] transfers   [] heat/ice application    [] other:      Other Objective/Functional Measures: VC exercises and technique     Pain Level (0-10 scale) post treatment: 0    ASSESSMENT/Changes in Function: first full day back and tolerated well. Residual C/O burning. Patient will continue to benefit from skilled PT services to modify and progress therapeutic interventions, address functional mobility deficits, address ROM deficits, address strength deficits, analyze and address soft tissue restrictions, analyze and cue movement patterns, analyze and modify body mechanics/ergonomics, assess and modify postural abnormalities and instruct in home and community integration to attain remaining goals. [x]  See Plan of Care  []  See progress note/recertification  []  See Discharge Summary         Progress towards goals / Updated goals:   Short Term Goals:  To be accomplished in 5 treatments:  1 patient will have established and be independent with HEP to aid with progression of skilled PT program  EVAL issued  CURRENT progressing 5/26/17  2 patient will have FOTO improved to 57 to show increase tolerance to work demands  EVAL 48  CURRENT  3 patient will have pain 3/10 to aid with increase tolerance to ADLs and activities  EVAL 5  CURRENT 0 5/26/17     Long Term Goals:  To be accomplished in 10 treatments:  1 patient will have FOTO improved to 67 to show increase tolerance to work demands  EVAL 48  CURRENT  2 patient will have pain 1-2/10 to aid with increase tolerance to ADLs and activities  EVAL 5  CURRENT 0 5/26/17  3 patient will report 50% overall improvement to aid with increase tolerance to ADLS and activities  EVAL  CURRENT       PLAN  [x]  Upgrade activities as tolerated     [x]  Continue plan of care  []  Update interventions per flow sheet       []  Discharge due to:_  []  Other:_      Woody Hearing, PT 5/26/2017  2:24 PM    Future Appointments  Date Time Provider Saadia Muñiz   5/26/2017 2:30 PM Gates Hearing, PT [facility]   6/6/2017 2:30 PM Trista Sully, PT [facility]   6/8/2017 2:30 PM Crystal Deborah, PTA [facility]   6/13/2017 2:30 PM Trista Sully, PT [facility]   6/15/2017 2:30 PM Crystal Deborah, PTA [facility]   6/20/2017 2:30 PM Trista Sully, PT [facility]   6/22/2017 2:30 PM Gates Hearing, PT [facility]   6/27/2017 2:30 PM Trista Sully, PT [facility]   6/29/2017 2:30 PM Woody Hearing, PT [facility]   7/5/2017 8:30 AM Jason Rueda  [address]

## 2017-06-01 ENCOUNTER — HOSPITAL ENCOUNTER (OUTPATIENT)
Dept: PHYSICAL THERAPY | Age: 37
Discharge: HOME | End: 2017-06-01
Payer: COMMERCIAL

## 2017-06-01 PROCEDURE — 97035 APP MDLTY 1+ULTRASOUND EA 15: CPT

## 2017-06-01 PROCEDURE — 97140 MANUAL THERAPY 1/> REGIONS: CPT

## 2017-06-01 PROCEDURE — 97110 THERAPEUTIC EXERCISES: CPT

## 2017-06-01 NOTE — PROGRESS NOTES
PT DAILY TREATMENT NOTE     Patient Name: Kerwin Marcus  Date:2017  : 1980  [x]  Patient  Verified  Payor: BLUE CROSS / Plan: [facility] PPO / Product Type: PPO /    In time:225  Out time:332  Total Treatment Time (min):62  Visit #: 3 of 10    Treatment Area: Radiculopathy, lumbar region [M54.16]    SUBJECTIVE  Pain Level (0-10 scale): 1  Any medication changes, allergies to medications, adverse drug reactions, diagnosis change, or new procedure performed?: [x] No    [] Yes (see summary sheet for update)  Subjective functional status/changes:   [] No changes reported  It is doing better than when I started the first day.     OBJECTIVE    Modality rationale: decrease inflammation, decrease pain and increase tissue extensibility to improve the patients ability to aid with increase tolerance to ADLs and activities   Min Type Additional Details    [] Estim:  []Unatt       []IFC  []Premod                        []Other:  []w/ice   []w/heat  Position:  Location:    [] Estim: []Att    []TENS instruct  []NMES                    []Other:  []w/US   []w/ice   []w/heat  Position:  Location:    []  Traction: [] Cervical       []Lumbar                       [] Prone          []Supine                       []Intermittent   []Continuous Lbs:  [] before manual  [] after manual   8 [x]  Ultrasound: [x]Continuous   [] Pulsed                           [x]1MHz   []3MHz W/cm2:1.3  Location:B LS    []  Iontophoresis with dexamethasone         Location: [] Take home patch   [] In clinic   10 [x]  Ice   post  []  heat  []  Ice massage  []  Laser   []  Anodyne Position:prone  Location:B LS    []  Laser with stim  []  Other:  Position:  Location:    []  Vasopneumatic Device Pressure:       [] lo [] med [] hi   Temperature: [] lo [] med [] hi   [] Skin assessment post-treatment:  []intact []redness- no adverse reaction    []redness - adverse reaction:       min []Eval                  []Re-Eval       34 min Therapeutic Exercise:  [x] See flow sheet :   Rationale: increase ROM, increase strength and improve coordination to improve the patients ability to aid with increase tolerance to ADLS and activities     min Therapeutic Activity:  []  See flow sheet :   Rationale:   to improve the patients ability to       min Neuromuscular Re-education:  []  See flow sheet :   Rationale:   to improve the patients ability to     10 min Manual Therapy:  STM DTM EFFLEURAGE B LS    Rationale: decrease pain, increase ROM, increase tissue extensibility and decrease trigger points to aid with increase tolerance to ADLS and activities     min Gait Training:  ___ feet with ___ device on level surfaces with ___ level of assist   Rationale: With   [] TE   [] TA   [] neuro   [] other: Patient Education: [x] Review HEP    [] Progressed/Changed HEP based on:   [] positioning   [] body mechanics   [] transfers   [] heat/ice application    [] other:      Other Objective/Functional Measures: VC exercises and technique     Pain Level (0-10 scale) post treatment: 0    ASSESSMENT/Changes in Function: tolerated well. Decrease pain compared to evaluation date. Patient will continue to benefit from skilled PT services to modify and progress therapeutic interventions, address functional mobility deficits, address ROM deficits, address strength deficits, analyze and address soft tissue restrictions, analyze and cue movement patterns, analyze and modify body mechanics/ergonomics, assess and modify postural abnormalities and instruct in home and community integration to attain remaining goals. [x]  See Plan of Care  []  See progress note/recertification  []  See Discharge Summary         Progress towards goals / Updated goals:   Short Term Goals:  To be accomplished in 5 treatments:  1 patient will have established and be independent with HEP to aid with progression of skilled PT program  JESSICAAL issued  CURRENT progressing 5/26/17 6/1/17  2 patient will have FOTO improved to 57 to show increase tolerance to work demands  EVAL 48  CURRENT  3 patient will have pain 3/10 to aid with increase tolerance to ADLs and activities  EVAL 5  CURRENT 1 6/1/17      Long Term Goals:  To be accomplished in 10 treatments:  1 patient will have FOTO improved to 67 to show increase tolerance to work demands  EVAL 48  CURRENT  2 patient will have pain 1-2/10 to aid with increase tolerance to ADLs and activities  EVAL 5  CURRENT 0 5/26/17   1 6/1/17  3 patient will report 50% overall improvement to aid with increase tolerance to ADLS and activities  EVAL  CURRENT    PLAN  [x]  Upgrade activities as tolerated     [x]  Continue plan of care  []  Update interventions per flow sheet       []  Discharge due to:_  []  Other:_      Marleen Whaley, PT 6/1/2017  2:22 PM    Future Appointments  Date Time Provider Saadia Muñiz   6/1/2017 2:30 PM Marleen Whaley, PT [facility]   6/6/2017 2:30 PM Betha Handsome, PT [facility]   6/8/2017 2:30 PM Crystal Deborah, PTA [facility]   6/13/2017 2:30 PM Betha Handsome, PT [facility]   6/15/2017 2:30 PM Crystal Deborah[facility]   6/20/2017 2:30 PM Betdevan Handsome, PT [facility]   6/22/2017 2:30 PM Marleen Pheasant, PT [facility]   6/27/2017 2:30 PM Betdevan Handsome, PT [facility]   6/29/2017 2:30 PM Marleen Pherandy, PT [facility]   7/5/2017 8:30 AM Kameron Abel  [address]

## 2017-06-06 ENCOUNTER — HOSPITAL ENCOUNTER (OUTPATIENT)
Dept: PHYSICAL THERAPY | Age: 37
Discharge: HOME | End: 2017-06-06
Payer: COMMERCIAL

## 2017-06-06 PROCEDURE — 97110 THERAPEUTIC EXERCISES: CPT

## 2017-06-06 NOTE — PROGRESS NOTES
PT DAILY TREATMENT NOTE 3-16    Patient Name: Brian   Date:2017  : 1980  [x]  Patient  Verified  Payor: BLUE JAMESON / Plan: Antonia Tello [id number] PPO / Product Type: PPO /    In time:2:20  Out time:3:10  Total Treatment Time (min): 50  Visit #: 4 of 10    Treatment Area: Radiculopathy, lumbar region [M54.16]    SUBJECTIVE  Pain Level (0-10 scale): 0  Any medication changes, allergies to medications, adverse drug reactions, diagnosis change, or new procedure performed?: [x] No    [] Yes (see summary sheet for update)  Subjective functional status/changes:   [] No changes reported  Doing better since coming here    OBJECTIVE  44 min Therapeutic Exercise:  [x] See flow sheet :   Rationale: increase ROM, increase strength and improve coordination to improve the patients ability to perform increased ADL         With   [x] TE   [] TA   [] neuro   [] other: Patient Education: [x] Review HEP    [] Progressed/Changed HEP based on:   [] positioning   [] body mechanics   [] transfers   [] heat/ice application    [] other:      Other Objective/Functional Measures:   - Good response to treatment program  - Pt tolerated treatment program w/o added pain today     Pain Level (0-10 scale) post treatment: 0    ASSESSMENT/Changes in Function:      Patient will continue to benefit from skilled PT services to modify and progress therapeutic interventions, address functional mobility deficits, address ROM deficits, address strength deficits, analyze and address soft tissue restrictions and analyze and cue movement patterns to attain remaining goals. []  See Plan of Care  []  See progress note/recertification  []  See Discharge Summary         Progress towards goals / Updated goals:  Short Term Goals:  To be accomplished in 5 treatments:  1 patient will have established and be independent with HEP to aid with progression of skilled PT program  EVAL issued  CURRENT progressing 17  2 patient will have FOTO improved to 57 to show increase tolerance to work demands  EVAL 48  CURRENT  3 patient will have pain 3/10 to aid with increase tolerance to ADLs and activities  EVAL 5  CURRENT Progressing at 0/10 today 6/6/17      Long Term Goals:  To be accomplished in 10 treatments:  1 patient will have FOTO improved to 67 to show increase tolerance to work demands  EVAL 48  CURRENT  2 patient will have pain 1-2/10 to aid with increase tolerance to ADLs and activities  EVAL 5  CURRENT 0 5/26/17 1 6/1/17  3 patient will report 50% overall improvement to aid with increase tolerance to ADLS and activities  EVAL  CURRENT    PLAN  [x]  Upgrade activities as tolerated     [x]  Continue plan of care  []  Update interventions per flow sheet       []  Discharge due to:_  []  Other:_      Nestora Part, PT 6/6/2017  6:37 PM    Future Appointments  Date Time Provider Saadia Muñiz   6/8/2017 2:30 PM Yanique Calles, PTA [facility]   6/13/2017 2:30 PM Nestora Part, PT [facility]   6/15/2017 2:30 PM Yanique Deborah, PTA [facility]   6/20/2017 2:30 PM Nestora Part, PT [facility]   6/22/2017 2:30 PM Zunilda Babcock, PT [facility]   6/27/2017 2:30 PM Nestora Part, PT [facility]   6/29/2017 2:30 PM Zunilda Babcock, PT [facility]   7/5/2017 8:30 AM Zay Roberts  [address]

## 2017-06-08 ENCOUNTER — HOSPITAL ENCOUNTER (OUTPATIENT)
Dept: PHYSICAL THERAPY | Age: 37
Discharge: HOME | End: 2017-06-08
Payer: COMMERCIAL

## 2017-06-08 PROCEDURE — 97110 THERAPEUTIC EXERCISES: CPT

## 2017-06-08 NOTE — PROGRESS NOTES
PT DAILY TREATMENT NOTE - [facility]     Patient Name: Mike Pedroza  Date:2017  : 1980  [x]  Patient  Verified  Payor: BLUE CROSS / Plan: Antonia Tello [id number] PPO / Product Type: PPO /    In time:2:29  Out time:3:03  Total Treatment Time (min): 26  Visit #: 5 of 10    Treatment Area: Radiculopathy, lumbar region [M54.16]    SUBJECTIVE  Pain Level (0-10 scale):0  Any medication changes, allergies to medications, adverse drug reactions, diagnosis change, or new procedure performed?: [x] No    [] Yes (see summary sheet for update)  Subjective functional status/changes:   [] No changes reported  No pain.     OBJECTIVE    Modality rationale: decrease edema, decrease inflammation, decrease pain and increase tissue extensibility to improve the patients ability to perform ADL    Min Type Additional Details    [] Estim:  []Unatt       []IFC  []Premod                        []Other:  []w/ice   []w/heat  Position:  Location:    [] Estim: []Att    []TENS instruct  []NMES                    []Other:  []w/US   []w/ice   []w/heat  Position:  Location:    []  Traction: [] Cervical       []Lumbar                       [] Prone          []Supine                       []Intermittent   []Continuous Lbs:  [] before manual  [] after manual    []  Ultrasound: []Continuous   [] Pulsed                           []1MHz   []3MHz W/cm2:  Location:    []  Iontophoresis with dexamethasone         Location: [] Take home patch   [] In clinic    []  Ice     []  heat  []  Ice massage  []  Laser   []  Anodyne Position:  Location:    []  Laser with stim  []  Other:  Position:  Location:    []  Vasopneumatic Device Pressure:       [] lo [] med [] hi   Temperature: [] lo [] med [] hi   [x] Skin assessment post-treatment:  [x]intact []redness- no adverse reaction    []redness - adverse reaction:      min []Eval                  []Re-Eval       26 min Therapeutic Exercise:  [x] See flow sheet :   Rationale: increase ROM and increase strength to improve the patients ability to perform ADL      min Therapeutic Activity:  []  See flow sheet :   Rationale:   to improve the patients ability to       min Neuromuscular Re-education:  []  See flow sheet :   Rationale:   to improve the patients ability to      min Manual Therapy:     Rationale:  to      min Gait Training:  ___ feet with ___ device on level surfaces with ___ level of assist   Rationale: With   [x] TE   [] TA   [] neuro   [] other: Patient Education: [x] Review HEP    [] Progressed/Changed HEP based on:   [] positioning   [] body mechanics   [] transfers   [] heat/ice application    [] other:      Other Objective/Functional Measures: FOTO:  75     Pain Level (0-10 scale) post treatment: 0    ASSESSMENT/Changes in Function:  FOTO has  Improved  From  48  To 75. Patient will continue to benefit from skilled PT services to address functional mobility deficits, address ROM deficits, address strength deficits, analyze and address soft tissue restrictions, analyze and cue movement patterns and instruct in home and community integration to attain remaining goals. []  See Plan of Care  []  See progress note/recertification  []  See Discharge Summary         Progress towards goals / Updated goals:  Short Term Goals: To be accomplished in 5 treatments:  1 patient will have established and be independent with HEP to aid with progression of skilled PT program  EVAL issued  CURRENT progressing 5/26/17 6/1/17  2 patient will have FOTO improved to 57 to show increase tolerance to work demands  EVAL 48  CURRENT  75%  6/8/17  3 patient will have pain 3/10 to aid with increase tolerance to ADLs and activities  EVAL 5  CURRENT Progressing at 0/10 today 6/6/17      Long Term Goals:  To be accomplished in 10 treatments:  1 patient will have FOTO improved to 67 to show increase tolerance to work demands  EVAL 48  CURRENT  2 patient will have pain 1-2/10 to aid with increase tolerance to ADLs and activities  EVAL 5  CURRENT 0 5/26/17 1 6/1/17  3 patient will report 50% overall improvement to aid with increase tolerance to ADLS and activities  EVAL  CURRENT    PLAN  []  Upgrade activities as tolerated     []  Continue plan of care  []  Update interventions per flow sheet       []  Discharge due to:_  [x]  Other:_  Add  Lift/carry/Y's&T's    Yanique Cabrera, PTA 6/8/2017  3:01 PM    Future Appointments  Date Time Provider Saadia Muñiz   6/13/2017 2:30 PM Thu Mitchell, PT [facility]   6/15/2017 2:30 PM Yanique Cabrera, PTA [facility]   6/20/2017 2:30 PM Thu Mitchell, PT [facility]   6/22/2017 2:30 PM Stephanie Awan PT [facility]   6/27/2017 2:30 PM Thu Mitchell, PT [facility]   6/29/2017 2:30 PM Stephanie Awan PT [facility]   7/5/2017 8:30 AM Teresita Horton  [address]

## 2017-06-13 ENCOUNTER — HOSPITAL ENCOUNTER (OUTPATIENT)
Dept: PHYSICAL THERAPY | Age: 37
Discharge: HOME | End: 2017-06-13
Payer: COMMERCIAL

## 2017-06-13 PROCEDURE — 97116 GAIT TRAINING THERAPY: CPT

## 2017-06-13 NOTE — PROGRESS NOTES
PT DAILY TREATMENT NOTE 3-16    Patient Name: Martha Phillip  Date:2017  : 1980  [x]  Patient  Verified  Payor: BLUE [employer] / Plan: Antonia Tello [id number] PPO / Product Type: PPO /    In time:2:33  Out time:3:20  Total Treatment Time (min): 52  Visit #: 6 of 10    Treatment Area: Radiculopathy, lumbar region [M54.16]    SUBJECTIVE  Pain Level (0-10 scale): 3-4  Any medication changes, allergies to medications, adverse drug reactions, diagnosis change, or new procedure performed?: [x] No    [] Yes (see summary sheet for update)  Subjective functional status/changes:   [] No changes reported  Had a little pain but doing better    OBJECTIVE  47 min Therapeutic Exercise:  [] See flow sheet :   Rationale: increase ROM, increase strength and improve coordination to improve the patients ability to tolerate increased activity levels         With   [x] TE   [] TA   [] neuro   [] other: Patient Education: [x] Review HEP    [] Progressed/Changed HEP based on:   [] positioning   [] body mechanics   [] transfers   [] heat/ice application    [] other:      Other Objective/Functional Measures:   - Good response to treatment program     Pain Level (0-10 scale) post treatment: 0    ASSESSMENT/Changes in Function:      Patient will continue to benefit from skilled PT services to modify and progress therapeutic interventions, address functional mobility deficits, address ROM deficits, address strength deficits, analyze and cue movement patterns and analyze and modify body mechanics/ergonomics to attain remaining goals. []  See Plan of Care  []  See progress note/recertification  []  See Discharge Summary         Progress towards goals / Updated goals:  Short Term Goals:  To be accomplished in 5 treatments:  1 patient will have established and be independent with HEP to aid with progression of skilled PT program  EVAL issued  CURRENT progressing 17  2 patient will have FOTO improved to 57 to show increase tolerance to work demands  EVAL 48  CURRENT 75% 6/8/17  3 patient will have pain 3/10 to aid with increase tolerance to ADLs and activities  EVAL 5  CURRENT Progressing at 0/10 today 6/6/17      Long Term Goals:  To be accomplished in 10 treatments:  1 patient will have FOTO improved to 67 to show increase tolerance to work demands  EVAL 48  CURRENT  2 patient will have pain 1-2/10 to aid with increase tolerance to ADLs and activities  EVAL 5  CURRENT 0 5/26/17 1 6/1/17  3 patient will report 50% overall improvement to aid with increase tolerance to ADLS and activities  EVAL  CURRENT    PLAN  [x]  Upgrade activities as tolerated     [x]  Continue plan of care  []  Update interventions per flow sheet       []  Discharge due to:_  []  Other:_      Medhat Corey, PT 6/13/2017  6:10 PM    Future Appointments  Date Time Provider Saadia Muñiz   6/15/2017 2:30 PM Yanique Calles PTA [facility]   6/20/2017 2:30 PM Medhat Corey, PT [facility]   6/22/2017 2:30 PM Blossom Alvarenga, PT [facility]   6/27/2017 2:30 PM Medhat Corey, PT [facility]   6/29/2017 2:30 PM Blossom Alvarenga, PT [facility]   7/5/2017 8:30 AM Xuan Lindquist  [address]

## 2017-06-15 ENCOUNTER — HOSPITAL ENCOUNTER (OUTPATIENT)
Dept: PHYSICAL THERAPY | Age: 37
Discharge: HOME | End: 2017-06-15
Payer: COMMERCIAL

## 2017-06-15 PROCEDURE — 97110 THERAPEUTIC EXERCISES: CPT

## 2017-06-15 NOTE — PROGRESS NOTES
PT DAILY TREATMENT NOTE - [facility]     Patient Name: Hernando Aden  Date:6/15/2017  : 1980  [x]  Patient  Verified  Payor: BLUE CROSS / Plan: Antonia Tello [id number] PPO / Product Type: PPO /    In time:2:30  Out time:3:14  Total Treatment Time (min): 44  Visit #: 7 of 10    Treatment Area: Radiculopathy, lumbar region [M54.16]    SUBJECTIVE  Pain Level (0-10 scale): 3  Any medication changes, allergies to medications, adverse drug reactions, diagnosis change, or new procedure performed?: [x] No    [] Yes (see summary sheet for update)  Subjective functional status/changes:   [] No changes reported  Some pain.     OBJECTIVE    Modality rationale: decrease edema, decrease inflammation, decrease pain and increase tissue extensibility to improve the patients ability to perform ADL    Min Type Additional Details    [] Estim:  []Unatt       []IFC  []Premod                        []Other:  []w/ice   []w/heat  Position:  Location:    [] Estim: []Att    []TENS instruct  []NMES                    []Other:  []w/US   []w/ice   []w/heat  Position:  Location:    []  Traction: [] Cervical       []Lumbar                       [] Prone          []Supine                       []Intermittent   []Continuous Lbs:  [] before manual  [] after manual    []  Ultrasound: []Continuous   [] Pulsed                           []1MHz   []3MHz W/cm2:  Location:    []  Iontophoresis with dexamethasone         Location: [] Take home patch   [] In clinic    []  Ice     []  heat  []  Ice massage  []  Laser   []  Anodyne Position:  Location:    []  Laser with stim  []  Other:  Position:  Location:    []  Vasopneumatic Device Pressure:       [] lo [] med [] hi   Temperature: [] lo [] med [] hi   [x] Skin assessment post-treatment:  [x]intact []redness- no adverse reaction    []redness - adverse reaction:      min []Eval                  []Re-Eval       44 min Therapeutic Exercise:  [x] See flow sheet :   Rationale: increase ROM and increase strength to improve the patients ability to perform ADL      min Therapeutic Activity:  []  See flow sheet :   Rationale: increase ROM and increase strength  to improve the patients ability to perform ADL      min Neuromuscular Re-education:  []  See flow sheet :   Rationale:   to improve the patients ability to      min Manual Therapy:     Rationale: decrease pain, increase ROM, increase tissue extensibility and decrease edema  to perform ADL      min Gait Training:  ___ feet with ___ device on level surfaces with ___ level of assist   Rationale: With   [x] TE   [] TA   [] neuro   [] other: Patient Education: [x] Review HEP    [] Progressed/Changed HEP based on:   [] positioning   [] body mechanics   [] transfers   [] heat/ice application    [] other:      Other Objective/Functional Measures:  Stiffness  And  Pain  Was  Resolved  Following  treatment    Pain Level (0-10 scale) post treatment: 0    ASSESSMENT/Changes in Function: Discussed  With pt on performing  Stretches  In the morning  And  Before  Bed. Patient will continue to benefit from skilled PT services to address functional mobility deficits, address ROM deficits, address strength deficits, analyze and address soft tissue restrictions, analyze and cue movement patterns and instruct in home and community integration to attain remaining goals. [x]  See Plan of Care  []  See progress note/recertification  []  See Discharge Summary         Progress towards goals / Updated goals:  Short Term Goals:  To be accomplished in 5 treatments:  1 patient will have established and be independent with HEP to aid with progression of skilled PT program  EVAL issued  CURRENT progressing 5/26/17 6/1/17  2 patient will have FOTO improved to 57 to show increase tolerance to work demands  EVAL 48  CURRENT 75% 6/8/17  3 patient will have pain 3/10 to aid with increase tolerance to ADLs and activities  EVAL 5  CURRENT Progressing at 0/10 today 6/6/17      Long Term Goals:  To be accomplished in 10 treatments:  1 patient will have FOTO improved to 67 to show increase tolerance to work demands  EVAL 48  CURRENT  2 patient will have pain 1-2/10 to aid with increase tolerance to ADLs and activities  EVAL 5  CURRENT 0 5/26/17 1 6/1/17  3 patient will report 50% overall improvement to aid with increase tolerance to ADLS and activities  EVAL  CURRENT    PLAN  []  Upgrade activities as tolerated     [x]  Continue plan of care  []  Update interventions per flow sheet       []  Discharge due to:_  [x]  Other:_  Trial  Stoop and  Lift  NV    [employer], PTA 6/15/2017  2:31 PM    Future Appointments  Date Time Provider Saadia Muñiz   6/20/2017 2:30 PM Ilene Chan, PT [facility]   6/22/2017 2:30 PM Grupo Rockwell, PT [facility]   6/27/2017 2:30 PM Ilene Chan PT [facility]   6/29/2017 2:30 PM Grupo Rockwell PT [facility]   7/5/2017 8:30 AM Jun Travis  [address]

## 2017-06-20 ENCOUNTER — HOSPITAL ENCOUNTER (OUTPATIENT)
Dept: PHYSICAL THERAPY | Age: 37
Discharge: HOME | End: 2017-06-20
Payer: COMMERCIAL

## 2017-06-20 PROCEDURE — 97110 THERAPEUTIC EXERCISES: CPT

## 2017-06-20 NOTE — PROGRESS NOTES
PT DAILY TREATMENT NOTE 3-16    Patient Name: Sigrid Kwong  Date:2017  : 1980  [x]  Patient  Verified  Payor: BLUE CROSS / Plan: Antonia Tello [id number] PPO / Product Type: PPO /    In time:2:19  Out time:3:04  Total Treatment Time (min): 40  Visit #: 8 of 10    Treatment Area: Radiculopathy, lumbar region [M54.16]    SUBJECTIVE  Pain Level (0-10 scale): 0  Any medication changes, allergies to medications, adverse drug reactions, diagnosis change, or new procedure performed?: [x] No    [] Yes (see summary sheet for update)  Subjective functional status/changes:   [] No changes reported  Doing good, no pain at all    OBJECTIVE  40 min Therapeutic Exercise:  [] See flow sheet :   Rationale: increase ROM, increase strength and improve coordination to improve the patients ability to tolerate increased activity levels         With   [x] TE   [] TA   [] neuro   [] other: Patient Education: [x] Review HEP    [] Progressed/Changed HEP based on:   [] positioning   [] body mechanics   [] transfers   [] heat/ice application    [] other:      Other Objective/Functional Measures:   - Good response to treatment program     Pain Level (0-10 scale) post treatment: 0    ASSESSMENT/Changes in Function:      Patient will continue to benefit from skilled PT services to modify and progress therapeutic interventions, address functional mobility deficits, address ROM deficits, address strength deficits, analyze and cue movement patterns and analyze and modify body mechanics/ergonomics to attain remaining goals. []  See Plan of Care  []  See progress note/recertification  []  See Discharge Summary         Progress towards goals / Updated goals:  Short Term Goals:  To be accomplished in 5 treatments:  1 patient will have established and be independent with HEP to aid with progression of skilled PT program  EVAL issued  CURRENT progressing 17  2 patient will have FOTO improved to 57 to show increase tolerance to work demands  EVAL 48  CURRENT 75% 6/8/17  3 patient will have pain 3/10 to aid with increase tolerance to ADLs and activities  EVAL 5  CURRENT Progressing at 0/10 today 6/20/17      Long Term Goals:  To be accomplished in 10 treatments:  1 patient will have FOTO improved to 67 to show increase tolerance to work demands  EVAL 48  CURRENT  2 patient will have pain 1-2/10 to aid with increase tolerance to ADLs and activities  EVAL 5  CURRENT Met at 0/10 6/20/17  3 patient will report 50% overall improvement to aid with increase tolerance to ADLS and activities  EVAL  CURRENT    PLAN  [x]  Upgrade activities as tolerated     [x]  Continue plan of care  []  Update interventions per flow sheet       []  Discharge due to:_  []  Other:_      Gee Garcia PT 6/20/2017  6:30 PM    Future Appointments  Date Time Provider Saadia Muñiz   6/22/2017 2:30 PM Tonia Fitch PT [facility]   6/27/2017 2:30 PM Gee Garcia PT [facility]   6/29/2017 2:30 PM Tonia Fitch PT [facility]   7/5/2017 8:30 AM Evens Sparks  [address]

## 2017-06-22 ENCOUNTER — APPOINTMENT (OUTPATIENT)
Dept: PHYSICAL THERAPY | Age: 37
End: 2017-06-22
Payer: COMMERCIAL

## 2017-06-27 ENCOUNTER — HOSPITAL ENCOUNTER (OUTPATIENT)
Dept: PHYSICAL THERAPY | Age: 37
Discharge: HOME | End: 2017-06-27
Payer: COMMERCIAL

## 2017-06-27 PROCEDURE — 97110 THERAPEUTIC EXERCISES: CPT

## 2017-06-27 NOTE — PROGRESS NOTES
PT DISCHARGE DAILY NOTE AND [id number]    Date:2017  Patient name: Devi Solomon Start of Care: 2017   Referral source: Stan Koroma MD : 1980                          Medical Diagnosis: Radiculopathy, lumbar region [M54.16] Onset Date:approx 1 month ago                          Treatment Diagnosis: decrease tolerance to ADLS and activities due to LBP L and L LE S/S   Prior Hospitalization: see medical history Provider#: [id number]   Medications: Verified on Patient summary List    Comorbidities: HTN   Prior Level of Function:  I all areas, No AD, works full time, needs to tolerate household chores    Visits from [facility]: 9    Missed Visits: 0    Reporting Period : 17 to 17    [x]  Patient  Verified  Payor: BLUE CROSS / Plan: [facility] BLUE CROSS [facility] PPO / Product Type: PPO /    In time:2:22  Out time:3:03  Total Treatment Time (min): 41  Visit #: 9 of 10    SUBJECTIVE  Pain Level (0-10 scale): 0  Any medication changes, allergies to medications, adverse drug reactions, diagnosis change, or new procedure performed?: [x] No    [] Yes (see summary sheet for update)  Subjective functional status/changes:   [] No changes reported  Able to put on my socks while standing w/o difficulty. Feeling normal now. OBJECTIVE        41 min Therapeutic Exercise:  [x] See flow sheet :   Rationale: increase ROM, increase strength and improve coordination to improve the patients ability to return to normal activity          With   [x] TE   [] TA   [] neuro   [] other: Patient Education: [x] Review HEP    [] Progressed/Changed HEP based on:   [] positioning   [] body mechanics   [] transfers   [] heat/ice application    [] other:      Other Objective/Functional Measures:   - Pt demo the ability to stoop and lift 65# floor to waist w/o difficulty     Pain Level (0-10 scale) post treatment: 0    Summary of Care:  Progress towards goals / Updated goals:  Short Term Goals:  To be accomplished in 5 treatments:  1 patient will have established and be independent with HEP to aid with progression of skilled PT program  EVAL issued  CURRENT progressing 5/26/17 6/1/17  2 patient will have FOTO improved to 57 to show increase tolerance to work demands  EVAL 48  CURRENT 75% 6/8/17  3 patient will have pain 3/10 to aid with increase tolerance to ADLs and activities  EVAL 5  CURRENT Progressing at 0/10 today 6/20/17      Long Term Goals: To be accomplished in 10 treatments:  1 patient will have FOTO improved to 67 to show increase tolerance to work demands  EVAL 48  CURRENT  2 patient will have pain 1-2/10 to aid with increase tolerance to ADLs and activities  EVAL 5  CURRENT Met at 0/10 6/20/17  3 patient will report 50% overall improvement to aid with increase tolerance to ADLS and activities  EVAL  CURRENT 100% improvement back to normal    ASSESSMENT/Changes in Function:  Patient has shown good progress with this treatment program. Pain as of last visit was 0/10. Patient has shown decreased pain and increased strength and mobility. Patient reports 100% improvement with overall involvement. Patient reports that he is back to normal.  Patient was able to stoop and lift 65# w/o difficulty. FOTO score is 98%. All STG/LTGs achieved as identified below.     Fall Risk Assessment: Patient demonstrates no Fall Risk   Thank you for this referral!      PLAN  [x]Discontinue therapy: [x]Patient has reached or is progressing toward set goals      []Patient is non-compliant or has abdicated      []Due to lack of appreciable progress towards set goals    Franco Benavides, PT 6/27/2017  2:51 PM

## 2017-06-29 ENCOUNTER — APPOINTMENT (OUTPATIENT)
Dept: PHYSICAL THERAPY | Age: 37
End: 2017-06-29
Payer: COMMERCIAL

## 2017-07-05 ENCOUNTER — OFFICE VISIT (OUTPATIENT)
Dept: ORTHOPEDIC SURGERY | Age: 37
End: 2017-07-05

## 2017-07-05 VITALS
TEMPERATURE: 98.6 F | OXYGEN SATURATION: 100 % | DIASTOLIC BLOOD PRESSURE: 76 MMHG | HEART RATE: 80 BPM | SYSTOLIC BLOOD PRESSURE: 133 MMHG | WEIGHT: 229 LBS | RESPIRATION RATE: 20 BRPM | BODY MASS INDEX: 33.92 KG/M2 | HEIGHT: 69 IN

## 2017-07-05 DIAGNOSIS — M54.16 LUMBAR RADICULOPATHY: Primary | ICD-10-CM

## 2017-07-05 NOTE — LETTER
NOTIFICATION RETURN TO WORK  
 
7/5/2017 8:57 AM 
 
Mr. Meron Rowe [address] [place] [id number] To Whom It May Concern: 
 
Meron Rowe is currently under the care of [address] [place] [address]. He was seen in our office on 07/05/2017. He will be able to return to work full duty and with no restrictions on 07/06/2017. If there are questions or concerns please have the patient contact our office.  
 
 
 
Sincerely, 
 
 
Matteo Biswas MD

## 2017-07-05 NOTE — PROGRESS NOTES
[address], [address]  [place], [address] [place] [address] Street  Phone: [phone]  Fax: [phone]        Faina Kaiser  : 1980  PCP: Herrera Billings MD  2017    PROGRESS NOTE      ASSESSMENT AND PLAN    Belinda Dubon comes in to the office today for PT f/u, which has provided great relief. He continues to have mild, intermittent pain. He is continuing with his gym-based exercise program. I recommended he continue with the HEP that was given by physical therapist.     I gave him a note to return to work full duty and with no restrictions. Pt will f/u prn. Amadeo Cagle was seen today for back pain. Diagnoses and all orders for this visit:    Lumbar radiculopathy       Follow-up Disposition: Not on File      HISTORY OF PRESENT ILLNESS  Belinda Dubon is a 31 y.o. male. A&P / HPI from 2017:  Belinda Dubon comes in to the office today c/o 10/10 aching lumbar pain that radiates down his lateral left leg to the bottom of his foot x 1 month. He took Prednisone 3x 60 mg tablets once with good relief and states that his radiating left leg pain subsided after it. His pain was likely due to a left L5 or S1 radiculopathy which was relieved with the Prednisone.     He was referred to [facility] therapy with workplace conditioning. We discussed another Prednisone dose pack should his pain worsen. Pt was prescribed diclofenac 75mg BID prn. The risks, benefits, and potential side effects of this medication were discussed.       He was given a work note to place him on medium duty with a 30 lb weight restriction.     Pt will f/u in 6 weeks or sooner if needed. Updates from 17:  Pt presents for left L5 or S1 radiculopathy f/u. The pain has improved with PT and medications. PAST MEDICAL HISTORY   No past medical history on file. No past surgical history on file. Nilson Myles       MEDICATIONS    Current Outpatient Prescriptions   Medication Sig Dispense Refill    diclofenac EC (VOLTAREN) 75 mg EC tablet Take 1 Tab by mouth two (2) times daily as needed. 60 Tab 5    methocarbamol (ROBAXIN) 500 mg tablet Take 1 Tab by mouth three (3) times daily. 20 Tab 0    losartan-hydroCHLOROthiazide (HYZAAR) 100-25 mg per tablet Take 1 Tab by mouth daily.  famotidine (PEPCID) 40 mg tablet Take 40 mg by mouth daily.  meloxicam (MOBIC) 15 mg tablet Take 1 Tab by mouth daily (with breakfast). 30 Tab 1    ranitidine (ZANTAC) 150 mg tablet Take 150 mg by mouth two (2) times a day. ALLERGIES  No Known Allergies       SOCIAL HISTORY    Social History     Social History    Marital status: UNKNOWN     Spouse name: N/A    Number of children: N/A    Years of education: N/A     Social History Main Topics    Smoking status: Current Some Day Smoker     Packs/day: 0.50    Smokeless tobacco: Never Used    Alcohol use Yes    Drug use: No    Sexual activity: Not on file     Other Topics Concern    Not on file     Social History Narrative       FAMILY HISTORY  No family history on file. REVIEW OF SYSTEMS  Review of Systems   Constitutional: Negative for chills, diaphoresis, fever, malaise/fatigue and weight loss. Respiratory: Negative for shortness of breath. Cardiovascular: Negative for chest pain and leg swelling. Gastrointestinal: Negative for constipation, nausea and vomiting. Neurological: Negative for dizziness, tingling, seizures, loss of consciousness, weakness and headaches. Psychiatric/Behavioral: The patient does not have insomnia. PHYSICAL EXAMINATION  There were no vitals taken for this visit. Pain Assessment  5/3/2017   Location of Pain Shoulder   Location Modifiers Left   Severity of Pain 0   Quality of Pain -   Duration of Pain -   Frequency of Pain -   Aggravating Factors -   Limiting Behavior -   Relieving Factors -           Constitutional:  Well developed, well nourished, in no acute distress.    Psychiatric: Affect and mood are appropriate. Integumentary: No rashes or abrasions noted on exposed areas. SPINE/MUSCULOSKELETAL EXAM    Cervical spine:  Neck is midline. Normal muscle tone. No focal atrophy is noted. ROM pain free. Shoulder ROM intact. Mild tenderness to palpation. Negative Spurling's sign. Negative Tinel's sign. Negative Kirby's sign.       Sensation in the bilateral arms grossly intact to light touch.      Lumbar spine:  No rash, ecchymosis, or gross obliquity. No fasciculations. No focal atrophy is noted. No pain with hip ROM. Full range of motion. Tenderness to palpation L>R. No tenderness to palpation at the sciatic notch. SI joints non-tender. Trochanters non tender. Pain with back extension > flexion.      Sensation in the bilateral legs grossly intact to light touch. MOTOR:      Biceps  Triceps Deltoids Wrist Ext Wrist Flex Hand Intrin   Right 5/5 5/5 5/5 5/5 5/5 5/5   Left 5/5 5/5 5/5 5/5 5/5 5/5             Hip Flex  Quads Hamstrings Ankle DF EHL Ankle PF   Right 5/5 5/5 5/5 5/5 5/5 5/5   Left 5/5 5/5 5/5 5/5 5/5 5/5     DTRs are 2+ biceps, triceps, brachioradialis, patella, and Achilles.     Negative Straight Leg raise. Squat not tested. No difficulty with tandem gait.      Ambulation without assistive device. FWB.       RADIOGRAPHS  Lumbar XR images taken on 5/12/2017 personally reviewed with patient:  FINDINGS:      The study shows mild dextroscoliosis in thoracolumbar spine.      Lumbar vertebral bodies are of normal heights and in normal alignment is. There  is no evidence of fracture or compression in lumbar spine.      The study shows minimal degenerative disc disease at L3/L4 and L2/L3 levels.  At  L4/L5 and L5-S1 levels there are mild facet osteoarthritic changes.      IMPRESSION  IMPRESSION:      No evidence of fracture, vertebral body compression or malalignment in lumbar  spine or in visualized sacrum.      Minimal degenerative disc disease in lumbar spine.      Mild facet osteoarthritic changes in lower lumbar spine and lumbosacral  junction.      reviewed     Mr. Reji Jin has a reminder for a \"due or due soon\" health maintenance. I have asked that he contact his primary care provider for follow-up on this health maintenance.      This plan was reviewed with the patient and patient agrees. All questions were answered. More than half of this visit today was spent on counseling. Written by Hetal Mckeon, as dictated by Dr. Wood Diss. I, Dr. Wood Diss, confirm that all documentation is accurate.

## 2018-07-07 ENCOUNTER — HOSPITAL ENCOUNTER (EMERGENCY)
Age: 38
Discharge: HOME | End: 2018-07-07
Attending: EMERGENCY MEDICINE | Admitting: EMERGENCY MEDICINE
Payer: COMMERCIAL

## 2018-07-07 VITALS
DIASTOLIC BLOOD PRESSURE: 88 MMHG | RESPIRATION RATE: 15 BRPM | OXYGEN SATURATION: 99 % | HEART RATE: 56 BPM | TEMPERATURE: 99 F | SYSTOLIC BLOOD PRESSURE: 150 MMHG

## 2018-07-07 DIAGNOSIS — M54.42 ACUTE LEFT-SIDED LOW BACK PAIN WITH LEFT-SIDED SCIATICA: Primary | ICD-10-CM

## 2018-07-07 PROCEDURE — 74011250637 HC RX REV CODE- 250/637: Performed by: PHYSICIAN ASSISTANT

## 2018-07-07 PROCEDURE — 99283 EMERGENCY DEPT VISIT LOW MDM: CPT

## 2018-07-07 RX ORDER — HYDROCODONE BITARTRATE AND ACETAMINOPHEN 5; 325 MG/1; MG/1
1 TABLET ORAL
Status: COMPLETED | OUTPATIENT
Start: 2018-07-07 | End: 2018-07-07

## 2018-07-07 RX ORDER — METHOCARBAMOL 500 MG/1
500 TABLET, FILM COATED ORAL 3 TIMES DAILY
Qty: 12 TAB | Refills: 0 | Status: SHIPPED | OUTPATIENT
Start: 2018-07-07 | End: 2021-06-23

## 2018-07-07 RX ORDER — PREDNISONE 10 MG/1
TABLET ORAL
Qty: 21 TAB | Refills: 0 | Status: SHIPPED | OUTPATIENT
Start: 2018-07-07 | End: 2018-08-07 | Stop reason: ALTCHOICE

## 2018-07-07 RX ADMIN — HYDROCODONE BITARTRATE AND ACETAMINOPHEN 1 TABLET: 5; 325 TABLET ORAL at 12:23

## 2018-07-07 NOTE — ED TRIAGE NOTES
Patient complains of back pain x 1 week. Patient has a hx of back pain and went to physical therapy for it and it felt better. Patient states it is hurting again.

## 2018-07-07 NOTE — DISCHARGE INSTRUCTIONS

## 2018-07-07 NOTE — ED PROVIDER NOTES
EMERGENCY DEPARTMENT HISTORY AND PHYSICAL EXAM 
 
Date: 7/7/2018 Patient Name: An Wallace History of Presenting Illness Chief Complaint Patient presents with  Back Pain History Provided By: Patient Chief Complaint: L leg pain Duration: 2 Days Timing:  Acute Location: left leg Quality: Aching and tingling Severity: Severe Modifying Factors: none Associated Symptoms: none Additional History (Context): An Wallace is a 30 y.o. male with hypertension, hyperlipidemia and sciatica  who presents with c/o left leg pain x 1 week worse the past 2 days. Pt states leg pain is radiating down his leg with tingling no numbness, 10/10. Pt states he has had this same pain before last year. Pt denied known trauma, injury however Pt works at [employer] and does some heavy lifting. Pt states he took a muscle relaxer today for pain, with mild relief. Pt denied fever, chills, nausea, vomiting, loss of bowel control. PCP: Saint Sofia, MD 
 
Current Facility-Administered Medications Medication Dose Route Frequency Provider Last Rate Last Dose  
 HYDROcodone-acetaminophen (NORCO) 5-325 mg per tablet 1 Tab  1 Tab Oral NOW Deland Schirmer, PA-C Current Outpatient Prescriptions Medication Sig Dispense Refill  methocarbamol (ROBAXIN) 500 mg tablet Take 1 Tab by mouth three (3) times daily. 12 Tab 0  predniSONE (STERAPRED DS) 10 mg dose pack Take as directed on dose pack 21 Tab 0  
 diclofenac EC (VOLTAREN) 75 mg EC tablet Take 1 Tab by mouth two (2) times daily as needed. 60 Tab 5  
 methocarbamol (ROBAXIN) 500 mg tablet Take 1 Tab by mouth three (3) times daily. 20 Tab 0  
 losartan-hydroCHLOROthiazide (HYZAAR) 100-25 mg per tablet Take 1 Tab by mouth daily.  famotidine (PEPCID) 40 mg tablet Take 40 mg by mouth daily.  meloxicam (MOBIC) 15 mg tablet Take 1 Tab by mouth daily (with breakfast). 30 Tab 1  ranitidine (ZANTAC) 150 mg tablet Take 150 mg by mouth two (2) times a day. Past History Past Medical History: No past medical history on file. Past Surgical History: No past surgical history on file. Family History: No family history on file. Social History: 
Social History Substance Use Topics  Smoking status: Current Some Day Smoker Packs/day: 0.50  Smokeless tobacco: Never Used  Alcohol use Yes Allergies: 
No Known Allergies Review of Systems Review of Systems Constitutional: Negative for chills and fever. HENT: Negative for congestion, rhinorrhea and sore throat. Respiratory: Negative for cough and shortness of breath. Cardiovascular: Negative for chest pain. Gastrointestinal: Negative for abdominal pain, constipation, diarrhea, nausea and vomiting. Genitourinary: Negative for frequency and urgency. Musculoskeletal: Positive for back pain. Skin: Negative for rash. Neurological: Negative for dizziness and weakness. All other systems reviewed and are negative. All Other Systems Negative Physical Exam  
 
Vitals:  
 07/07/18 1107 BP: 150/88 Pulse: (!) 56 Resp: 15 Temp: 99 °F (37.2 °C) SpO2: 99% Physical Exam  
Constitutional: He is oriented to person, place, and time. He appears well-developed and well-nourished. No distress. HENT:  
Head: Normocephalic and atraumatic. Eyes: Conjunctivae are normal. Right eye exhibits no discharge. Left eye exhibits no discharge. No scleral icterus. Neck: Normal range of motion. Neck supple. Cardiovascular: Normal rate, regular rhythm, normal heart sounds and intact distal pulses. Exam reveals no gallop and no friction rub. No murmur heard. Pulmonary/Chest: Effort normal and breath sounds normal. No respiratory distress. He has no wheezes. He has no rales. Abdominal: Soft. Bowel sounds are normal. There is no tenderness. Musculoskeletal: Normal range of motion. He exhibits tenderness (to perivetebral left spine).  He exhibits no edema. Left lumbar paraspinal muscles TTP, no midline tenderness. Overlying skin intact. Neurological: He is alert and oriented to person, place, and time. Skin: Skin is warm and dry. No rash noted. He is not diaphoretic. Psychiatric: He has a normal mood and affect. His behavior is normal. Judgment and thought content normal.  
Nursing note and vitals reviewed. Diagnostic Study Results Labs - No results found for this or any previous visit (from the past 12 hour(s)). Radiologic Studies - No orders to display CT Results  (Last 48 hours) None CXR Results  (Last 48 hours) None Medical Decision Making I am the first provider for this patient. I reviewed the vital signs, available nursing notes, past medical history, past surgical history, family history and social history. Records Reviewed: Nursing Notes and Old Medical Records Procedures: 
Procedures Provider Notes (Medical Decision Making): DDX: strain 12:17 PM Pt well appearing and in NAD. No red flags. Will treat with muscle relaxers and pred. Ortho f/u for further eval.  
 
MED RECONCILIATION: 
Current Facility-Administered Medications Medication Dose Route Frequency  HYDROcodone-acetaminophen (NORCO) 5-325 mg per tablet 1 Tab  1 Tab Oral NOW Current Outpatient Prescriptions Medication Sig  
 methocarbamol (ROBAXIN) 500 mg tablet Take 1 Tab by mouth three (3) times daily.  predniSONE (STERAPRED DS) 10 mg dose pack Take as directed on dose pack  diclofenac EC (VOLTAREN) 75 mg EC tablet Take 1 Tab by mouth two (2) times daily as needed.  methocarbamol (ROBAXIN) 500 mg tablet Take 1 Tab by mouth three (3) times daily.  losartan-hydroCHLOROthiazide (HYZAAR) 100-25 mg per tablet Take 1 Tab by mouth daily.  famotidine (PEPCID) 40 mg tablet Take 40 mg by mouth daily.  meloxicam (MOBIC) 15 mg tablet Take 1 Tab by mouth daily (with breakfast).   
 ranitidine (ZANTAC) 150 mg tablet Take 150 mg by mouth two (2) times a day. Disposition: 
discharge DISCHARGE NOTE:  
 
Patient is improved, resting quietly and comfortably. The patient will be discharged home.  
  
The patient was reassured that these symptoms do not appear to represent a serious or life threatening condition at this time. Warning signs of worsening condition were discussed and understood by the patient.  
  
Based on patient's age, coexisting illness, exam, and the results of this ED evaluation, the decision to treat as an outpatient was made. Based on the information available at time of discharge, acute pathology requiring immediate intervention was deemed relative unlikely. While it is impossible to completely exclude the possibility of underlying serious disease or worsening of condition, I feel the relative likelihood is extremely low. I discussed this uncertainty with the patient, who understood ED evaluation and treatment and felt comfortable with the outpatient treatment plan.  
  
All questions regarding care, test results, and follow up were answered. The patient is stable and appropriate to discharge. They understand that they should return to the emergency department for any new or worsening symptoms. I stressed the importance of follow up for repeat assessment and possibly further evaluation/treatment. Follow-up Information Follow up With Details Comments Contact Info Rafael Lugo MD Call To make a follow up appointment [address] [place] [address] 
[phone] [facility] EMERGENCY DEPT  Immediately if symptoms worsen [address] [place] [address] [address] Current Discharge Medication List  
  
START taking these medications Details  
!! methocarbamol (ROBAXIN) 500 mg tablet Take 1 Tab by mouth three (3) times daily.  
Qty: 12 Tab, Refills: 0  
  
predniSONE (STERAPRED DS) 10 mg dose pack Take as directed on dose pack 
Qty: 21 Tab, Refills: 0  
  
 !! - Potential duplicate medications found. Please discuss with provider. CONTINUE these medications which have NOT CHANGED Details  
diclofenac EC (VOLTAREN) 75 mg EC tablet Take 1 Tab by mouth two (2) times daily as needed. Qty: 60 Tab, Refills: 5 Associated Diagnoses: Myofascial pain  
  
!! methocarbamol (ROBAXIN) 500 mg tablet Take 1 Tab by mouth three (3) times daily. Qty: 20 Tab, Refills: 0  
  
losartan-hydroCHLOROthiazide (HYZAAR) 100-25 mg per tablet Take 1 Tab by mouth daily. famotidine (PEPCID) 40 mg tablet Take 40 mg by mouth daily. meloxicam (MOBIC) 15 mg tablet Take 1 Tab by mouth daily (with breakfast). Qty: 30 Tab, Refills: 1 Associated Diagnoses: Primary osteoarthritis of left shoulder  
  
ranitidine (ZANTAC) 150 mg tablet Take 150 mg by mouth two (2) times a day. !! - Potential duplicate medications found. Please discuss with provider. Diagnosis Clinical Impression: 1. Acute left-sided low back pain with left-sided sciatica

## 2018-08-07 ENCOUNTER — OFFICE VISIT (OUTPATIENT)
Dept: ORTHOPEDIC SURGERY | Age: 38
End: 2018-08-07

## 2018-08-07 VITALS
TEMPERATURE: 98 F | RESPIRATION RATE: 18 BRPM | SYSTOLIC BLOOD PRESSURE: 156 MMHG | HEART RATE: 56 BPM | DIASTOLIC BLOOD PRESSURE: 101 MMHG | HEIGHT: 69 IN

## 2018-08-07 DIAGNOSIS — M54.16 LUMBAR RADICULOPATHY: Primary | ICD-10-CM

## 2018-08-07 RX ORDER — PREDNISONE 10 MG/1
TABLET ORAL
Qty: 21 TAB | Refills: 0 | Status: SHIPPED | OUTPATIENT
Start: 2018-08-07 | End: 2018-09-18 | Stop reason: ALTCHOICE

## 2018-08-07 RX ORDER — GABAPENTIN 300 MG/1
300 CAPSULE ORAL 3 TIMES DAILY
Qty: 90 CAP | Refills: 2 | Status: SHIPPED | OUTPATIENT
Start: 2018-08-07 | End: 2021-06-23

## 2018-08-07 NOTE — MR AVS SNAPSHOT
[address] [place] [address] [address] [place] [id number] 
[phone] Patient: Lucien Diego MRN: [id number] Juwan Fenton Visit Information Date & Time Provider Department Dept. Phone Encounter #  
 8/7/2018  9:45 AM Kwasi Kasper MD [facility] [phone] [id number] Follow-up Instructions Return in about 6 weeks (around 9/18/2018). Upcoming Health Maintenance Date Due Pneumococcal 19-64 Medium Risk (1 of 1 - PPSV23) 8/25/2001 DTaP/Tdap/Td series (1 - Tdap) 8/25/2003 Influenza Age 5 to Adult 8/1/2018 Allergies as of 8/7/2018  Review Complete On: 8/7/2018 By: Epifanio Griffith No Known Allergies Current Immunizations  Never Reviewed No immunizations on file. Not reviewed this visit You Were Diagnosed With   
  
 Codes Comments Lumbar radiculopathy    -  Primary ICD-10-CM: M54.16 
ICD-9-CM: 724.4 Vitals BP Pulse Temp Resp Height(growth percentile) Smoking Status (!) 156/101 (!) 56 98 °F (36.7 °C) (Oral) 18 5' 9\" (1.753 m) Current Some Day Smoker Preferred Pharmacy Pharmacy Name Phone [address], [address] [place] [address] [phone] Your Updated Medication List  
  
   
This list is accurate as of 8/7/18 10:59 AM.  Always use your most recent med list.  
  
  
  
  
 diclofenac EC 75 mg EC tablet Commonly known as:  VOLTAREN Take 1 Tab by mouth two (2) times daily as needed. famotidine 40 mg tablet Commonly known as:  PEPCID Take 40 mg by mouth daily. gabapentin 300 mg capsule Commonly known as:  NEURONTIN Take 1 Cap by mouth three (3) times daily. losartan-hydroCHLOROthiazide 100-25 mg per tablet Commonly known as:  HYZAAR Take 1 Tab by mouth daily. meloxicam 15 mg tablet Commonly known as:  MOBIC Take 1 Tab by mouth daily (with breakfast). * methocarbamol 500 mg tablet Commonly known as:  ROBAXIN Take 1 Tab by mouth three (3) times daily. * methocarbamol 500 mg tablet Commonly known as:  ROBAXIN Take 1 Tab by mouth three (3) times daily. predniSONE 10 mg dose pack Commonly known as:  STERAPRED DS See administration instruction per 10mg dose pack  
  
 raNITIdine 150 mg tablet Commonly known as:  ZANTAC Take 150 mg by mouth two (2) times a day. * Notice: This list has 2 medication(s) that are the same as other medications prescribed for you. Read the directions carefully, and ask your doctor or other care provider to review them with you. Prescriptions Sent to Pharmacy Refills  
 gabapentin (NEURONTIN) 300 mg capsule 2 Sig: Take 1 Cap by mouth three (3) times daily. Class: Normal  
 Pharmacy: [facility] DR GLORIA HOGAN [address] [place] [address] [facility] Ph #: [phone] Route: Oral  
 predniSONE (STERAPRED DS) 10 mg dose pack 0 Sig: See administration instruction per 10mg dose pack Class: Normal  
 Pharmacy: [facility] DR GLORIA HOGAN [address] [place] [address], [facility] Ph #: [phone] We Performed the Following REFERRAL TO PHYSICAL THERAPY [UGO29 Custom] Comments:  
 eval and treat Low back pain radiating into LLE, likely lumbar radiculopathy Please include Neto therapy Follow-up Instructions Return in about 6 weeks (around 9/18/2018). Referral Information Referral ID Referred By Referred To  
  
 [id number] Teddy Paniagua [facility] [place] [facility] [address] [place], [place] Phone: [phone] Visits Status Start Date End Date 1 New Request 8/7/18 8/7/19 If your referral has a status of pending review or denied, additional information will be sent to support the outcome of this decision. Patient Instructions Gabapentin (Neurontin) 300 mg three times a day (TID) daily instructions: 
 
 
 Morning Afternoon Night Week 1 - - 1 pill Week 2 1 pill - 1 pill Week 3 and onwards 1 pill 1 pill 1 pill Week 1: Take one pill each night. Week 2: Take one pill in the morning and one pill at night. Week 3 and onwards: Take one pill in the morning, one pill in the afternoon, and one pill at night. Continue taking this dose each day. Introducing [facility]! Dear Larry Bah: Thank you for requesting a [employer] account. Our records indicate that you already have an active [employer] account. You can access your account anytime at [email or web address] Did you know that you can access your hospital and ER discharge instructions at any time in [employer]? You can also review all of your test results from your hospital stay or ER visit. Additional Information If you have questions, please visit the Frequently Asked Questions section of the [employer] website at [email or web address] Remember, [employer] is NOT to be used for urgent needs. For medical emergencies, dial 911. Now available from your iPhone and Android! Please provide this summary of care documentation to your next provider. Your primary care clinician is listed as [address]. If you have any questions after today's visit, please call [phone].

## 2018-08-07 NOTE — PROGRESS NOTES
[address], [address]  [place], [facility]  Phone: [phone]  Fax: [phone]        Dain Maxime  : 1980  PCP: Herrera Billings MD  2018    PROGRESS NOTE      ASSESSMENT AND PLAN    Maddison Grant comes in to the office today for worsening low back pain since , now radiating into the posterior aspect of his LLE in an S1 distribution. He rates his pain as a 10/10 today. On examination, he has a positive SLR on the L. He maintains strength and sensation. His pain is likely a lumbar radiculopathy. I advised on Neto therapy and referred to PT. I prescribed Gabapentin 300mg TID and a 10mg Prednisone dose pack. Pt will f/u in 6 weeks or sooner as needed. Diagnoses and all orders for this visit:    1. Lumbar radiculopathy  -     REFERRAL TO PHYSICAL THERAPY  -     gabapentin (NEURONTIN) 300 mg capsule; Take 1 Cap by mouth three (3) times daily. -     predniSONE (STERAPRED DS) 10 mg dose pack; See administration instruction per 10mg dose pack         Follow-up Disposition: Not on File      HISTORY OF PRESENT ILLNESS  Maddison Grant is a 30 y.o. male. A&P / HPI from 2017:  Lizzie Su in to the office today c/o 10/10 aching lumbar pain that radiates down his lateral left leg to the bottom of his foot x 1 month. He took Prednisone 3x 60 mg tablets once with good relief and states that his radiating left leg pain subsided after it. His pain was likely due to a left L5 or S1 radiculopathy which was relieved with the Prednisone.      He was referred to Shayy Santana therapy with workplace conditioning. We discussed another Prednisone dose pack should his pain worsen. Pt was prescribed diclofenac 75mg BID prn.  The risks, benefits, and potential side effects of this medication were discussed.        He was given a work note to place him on medium duty with a 30 lb weight restriction.      Pt will f/u in 6 weeks or sooner if needed.     Updates from 07/05/17:  Pt presents for left L5 or S1 radiculopathy f/u. The pain has improved with PT and medications.        Updates from 08/07/18:  Pt presents for an exacerbation of his low back pain radiating into his LLE in an S1 distribution. PAST MEDICAL HISTORY   No past medical history on file. No past surgical history on file. Rosey Pierce MEDICATIONS    Current Outpatient Prescriptions   Medication Sig Dispense Refill    methocarbamol (ROBAXIN) 500 mg tablet Take 1 Tab by mouth three (3) times daily. 12 Tab 0    predniSONE (STERAPRED DS) 10 mg dose pack Take as directed on dose pack 21 Tab 0    diclofenac EC (VOLTAREN) 75 mg EC tablet Take 1 Tab by mouth two (2) times daily as needed. 60 Tab 5    methocarbamol (ROBAXIN) 500 mg tablet Take 1 Tab by mouth three (3) times daily. 20 Tab 0    losartan-hydroCHLOROthiazide (HYZAAR) 100-25 mg per tablet Take 1 Tab by mouth daily.  famotidine (PEPCID) 40 mg tablet Take 40 mg by mouth daily.  meloxicam (MOBIC) 15 mg tablet Take 1 Tab by mouth daily (with breakfast). 30 Tab 1    ranitidine (ZANTAC) 150 mg tablet Take 150 mg by mouth two (2) times a day. ALLERGIES  No Known Allergies       SOCIAL HISTORY    Social History     Social History    Marital status: UNKNOWN     Spouse name: N/A    Number of children: N/A    Years of education: N/A     Social History Main Topics    Smoking status: Current Some Day Smoker     Packs/day: 0.50    Smokeless tobacco: Never Used    Alcohol use Yes    Drug use: No    Sexual activity: Not on file     Other Topics Concern    Not on file     Social History Narrative       FAMILY HISTORY  No family history on file. REVIEW OF SYSTEMS  Review of Systems   Musculoskeletal: Positive for back pain. LLE paraesthesia           PHYSICAL EXAMINATION  There were no vitals taken for this visit.     Pain Assessment  5/3/2017   Location of Pain Shoulder   Location Modifiers Left Severity of Pain 0   Quality of Pain -   Duration of Pain -   Frequency of Pain -   Aggravating Factors -   Limiting Behavior -   Relieving Factors -           Constitutional:  Well developed, well nourished, in no acute distress. Psychiatric: Affect and mood are appropriate. Integumentary: No rashes or abrasions noted on exposed areas. SPINE/MUSCULOSKELETAL EXAM    Cervical spine:  Neck is midline. Normal muscle tone. No focal atrophy is noted. ROM pain free. Shoulder ROM intact. Mild tenderness to palpation. Negative Spurling's sign. Negative Tinel's sign. Negative Kirby's sign.       Sensation in the bilateral arms grossly intact to light touch.       Lumbar spine:  No rash, ecchymosis, or gross obliquity. No fasciculations. No focal atrophy is noted. No pain with hip ROM. Full range of motion. Tenderness to palpation L>R. No tenderness to palpation at the sciatic notch. SI joints non-tender. Trochanters non tender. Pain with back extension > flexion.      Sensation in the bilateral legs grossly intact to light touch. Updates from 8/7/18:  Positive SLR on the L  Otherwise, neurologically intact    MOTOR:      Biceps  Triceps Deltoids Wrist Ext Wrist Flex Hand Intrin   Right 5/5 5/5 5/5 5/5 5/5 5/5   Left 5/5 5/5 5/5 5/5 5/5 5/5             Hip Flex  Quads Hamstrings Ankle DF EHL Ankle PF   Right 5/5 5/5 5/5 5/5 5/5 5/5   Left 5/5 5/5 5/5 5/5 5/5 5/5     DTRs are 2+ biceps, triceps, brachioradialis, patella, and Achilles.      Negative Straight Leg raise. Squat not tested. No difficulty with tandem gait.       Ambulation without assistive device. FWB.       RADIOGRAPHS  Lumbar XR images taken on 5/12/2017 personally reviewed with patient:  FINDINGS:      The study shows mild dextroscoliosis in thoracolumbar spine.      Lumbar vertebral bodies are of normal heights and in normal alignment is.  There  is no evidence of fracture or compression in lumbar spine.      The study shows minimal degenerative disc disease at L3/L4 and L2/L3 levels. At  L4/L5 and L5-S1 levels there are mild facet osteoarthritic changes.      IMPRESSION  IMPRESSION:      No evidence of fracture, vertebral body compression or malalignment in lumbar  spine or in visualized sacrum.      Minimal degenerative disc disease in lumbar spine.      Mild facet osteoarthritic changes in lower lumbar spine and lumbosacral  junction. 12 minutes of face-to-face contact were spent with the patient during today's visit extensively discussing symptoms and treatment plan. All questions were answered. More than half of this visit today was spent on counseling.      Written by Ky Persaud as dictated by Michael North MD

## 2018-08-10 ENCOUNTER — HOSPITAL ENCOUNTER (OUTPATIENT)
Dept: PHYSICAL THERAPY | Age: 38
Discharge: HOME | End: 2018-08-10
Payer: COMMERCIAL

## 2018-08-10 PROCEDURE — 97161 PT EVAL LOW COMPLEX 20 MIN: CPT

## 2018-08-10 PROCEDURE — 97110 THERAPEUTIC EXERCISES: CPT

## 2018-08-10 NOTE — PROGRESS NOTES
[facility] [address], [address] [place] [address], [address]  [phone] fax      Plan of Care/ Statement of Necessity for Physical Therapy Services    Patient name: Marshal Gill Start of Care: 8/10/2018   Referral source: Charlene Hwang MD : 1980    Medical Diagnosis: Radiculopathy, lumbar region [M54.16]   Onset Date:2018    Treatment Diagnosis: decrease tolerance to ADLs and activities due to back pain, Left LE S/S, + sciatic nerve tension and piriformis tension Left LE. Prior Hospitalization: see medical history Provider#: [id number]   Medications: Verified on Patient summary List    Comorbidities: HTN, previous episode of similar involvement   Prior Level of Function: I all areas, no AD used, ADLs and activities, no issues with work demands, household chores     The Plan of Care and following information is based on the information from the initial evaluation. Assessment/ key information: 31 YO male diagnosed as above and with S/S consistent with above diagnosis presents to skilled outpatient PT. CCO left LBP and left LE S/S to the mid calf. Onset 2018, insideous. Good days and bad days today's pain is 4/10. Previous Treatment/Compliance: has had a prior episode in 2017 with PT.  For this episode MD, medication,   Pain 4, FOTO 65, - lateral shift, increased lordosis, decreased right shoulder and scap levels, Trunk AROM FF to toes with Left LE S/S, BB 15 degrees, ROT B 50%, SB right 57 cm, Left 59cm, + left LE sciatic nerve tension and piriformis tension, moderate Left SIJ and piriformis STC and TTP, MMT B hip F, knee E and DF 5   Patient demonstrates the potential to make gains with improved ROM, strength, endurance/activity tolerance, functional FOTO survey score  and all within a reasonable time frame so as to increase their functional independence with ADLs and activities for carryover to improved quality of life, tolerance to household chores, work demands and community activities. Patient requires skilled Physical Therapy so as to monitor their response to and modify their treatment plan accordingly. Patient appears to be an appropriate candidate for skilled outpatient Physical Therapy.     Evaluation Complexity History MEDIUM  Complexity : 1-2 comorbidities / personal factors will impact the outcome/ POC ; Examination MEDIUM Complexity : 3 Standardized tests and measures addressing body structure, function, activity limitation and / or participation in recreation  ;Presentation LOW Complexity : Stable, uncomplicated  ;Clinical Decision Making MEDIUM Complexity : FOTO score of 26-74  Overall Complexity Rating: LOW   Problem List: pain affecting function, decrease ROM, decrease strength, impaired gait/ balance, decrease ADL/ functional abilitiies, decrease activity tolerance, decrease flexibility/ joint mobility, decrease transfer abilities and other FOTO 65   Treatment Plan may include any combination of the following: Therapeutic exercise, Therapeutic activities, Neuromuscular re-education, Physical agent/modality, Manual therapy, Patient education, Self Care training, Functional mobility training and Home safety training  Patient / Family readiness to learn indicated by: asking questions, trying to perform skills and interest  Persons(s) to be included in education: patient (P)  Barriers to Learning/Limitations: None  Patient Goal (s): get better  Patient Self Reported Health Status: good  Rehabilitation Potential: good    Short Term Goals: To be accomplished in 5 treatments:   1 patient will have established and be I with HEP to aid with progression of skilled PT program   EVAL issued   CURRENT   2 patient will have Pain 2-3/10 to aid with increase tolerance to ADLS and activities   EVAL 4 with Left LE S/S   CURRENT    Long Term Goals:  To be accomplished in 10 treatments:   1 patient will have Pain 1-2/10 to aid with increase tolerance to ADLS and activities   EVAL 4 with Left LE S/S   CURRENT   2 patient will have FOTO improved to 77 to show significant improved tolerance to household chores   EVAL 65   CURRENT   3 patient will have overall reports of 50% improvement to aid with increase tolerance to work demands, driving   EVAL decrease tolerance to sitting and driving   CURRENT      Frequency / Duration: Patient to be seen 2-3 times per week for 10 treatments. Patient/ Caregiver education and instruction: Diagnosis, prognosis, self care, activity modification and exercises   [x]  Plan of care has been reviewed with CHIQUI Nazario, PT 8/10/2018 4:00 PM  ________________________________________________________________________    I certify that the above Therapy Services are being furnished while the patient is under my care. I agree with the treatment plan and certify that this therapy is necessary.     Physician's Signature:____________Date:_________TIME:________    [employer], Date and Time must be completed for valid certification **      Please sign and return to [address] [place] [address], [address] [place] [address], [address] [place] [address]  [phone] fax

## 2018-08-10 NOTE — PROGRESS NOTES
PT DAILY TREATMENT NOTE/LUMBAR EVAL 3-16    Patient Name: Anitra Reed  Date:8/10/2018  : 1980  [x]  Patient  Verified  Payor: [employer] / Plan: Antonia Tello [id number] PPO / Product Type: PPO /    In time:328  Out time:358  Total Treatment Time (min): 30  Total Timed Codes (min): 8  1:1 Treatment Time ( only): 30   Visit #: 1 of 10    Treatment Area: Radiculopathy, lumbar region [M54.16]  SUBJECTIVE  Pain Level (0-10 scale): 4  []constant [x]intermittent [x]improving [x]worsening []no change since onset  WORSE prolonged sitting, getting up in the morning, driving long time, sitting straight up in a chair, BETTER sleeping  Any medication changes, allergies to medications, adverse drug reactions, diagnosis change, or new procedure performed?: [x] No    [] Yes (see summary sheet for update)  Subjective functional status/changes:     PLOF: I all areas, no AD used, ADLs and activities, no issues with work demands, household chores  Limitations to PLOF: Pain  Mechanism of Injury: insideous onset Left LBP and left LE pain to the cafl  Current symptoms/Complaints: 29 YO male diagnosed as above and with S/S consistent with above diagnosis presents to skilled outpatient PT. CCO left LBP and left LE S/S to the mid calf. Onset 2018, insideous. Good days and bad days today's pain is 4/10. Previous Treatment/Compliance: has had a prior episode in 2017 with PT.  For this episode MD, medication,   PMHx/Surgical Hx: HTN, previous episode similar to this one last year  Work Hx: Full time , on his feet a lot, does lifting, snatching and pulling  Living Situation: lives in a second floor apartment, no elevator, not alone  Pt Goals: get better  Barriers: [x]pain []financial []time []transportation []other  Motivation: good  Substance use: []Alcohol []Tobacco []other:   FABQ Score: []low []elevate  Cognition: A & O x 4   Other:    OBJECTIVE/EXAMINATION  Domestic Life: work, household chores, Activity/Recreational Limitations: pain  Mobility: I  Self Care: I        Modality rationale:     Min Type Additional Details    [] Estim:  []Unatt       []IFC  []Premod                        []Other:  []w/ice   []w/heat  Position:  Location:    [] Estim: []Att    []TENS instruct  []NMES                    []Other:  []w/US   []w/ice   []w/heat  Position:  Location:    []  Traction: [] Cervical       []Lumbar                       [] Prone          []Supine                       []Intermittent   []Continuous Lbs:  [] before manual  [] after manual    []  Ultrasound: []Continuous   [] Pulsed                           []1MHz   []3MHz Location:  W/cm2:    []  Iontophoresis with dexamethasone         Location: [] Take home patch   [] In clinic    []  Ice     []  heat  []  Ice massage  []  Laser   []  Anodyne Position:  Location:    []  Laser with stim  []  Other: Position:  Location:    []  Vasopneumatic Device Pressure:       [] lo [] med [] hi   Temperature: [] lo [] med [] hi   [] Skin assessment post-treatment:  []intact []redness- no adverse reaction    []redness  adverse reaction:     22 min [x]Eval                  []Re-Eval       8 min Therapeutic Exercise:  [x] See flow sheet :   Rationale: increase ROM, increase strength and improve coordination to improve the patients ability to aid with increase tolerance to ADLS and activities     min Therapeutic Activity:  []  See flow sheet :   Rationale:   to improve the patients ability to       min Neuromuscular Re-education:  []  See flow sheet :   Rationale:   to improve the patients ability to      min Manual Therapy:     Rationale:  to      min Gait Training:  ___ feet with ___ device on level surfaces with ___ level of assist   Rationale:           With   [] TE   [] TA   [] neuro   [] other: Patient Education: [x] Review HEP    [] Progressed/Changed HEP based on:   [] positioning   [] body mechanics   [] transfers   [] heat/ice application    [] other: Other Objective/Functional Measures:     Physical Therapy Evaluation - Lumbar Spine ([facility])    SUBJECTIVE  Chief Complaint:    Mechanism of injury:    Symptoms:  Aggravated by:   [] Bending [] Sitting [] Standing [] Walking   [] Moving [] Cough [] Sneeze [] Valsalva   [] AM  [] PM  Lying:  [] sup   [] pro   [] sidelying   [] Other:     Eased by:    [] Bending [] Sitting [] Standing [] Walking   [] Moving [] AM  [] PM  Lying: [] sup  [] pro  [] sidelying   [] Other:     General Health:  Red Flags Indicated? [] Yes    [] No  [] Yes [] No Recent weight change (If yes, due to dieting?  [] Yes  [] No)   [] Yes [] No Weakness in legs during walking  [] Yes [] No Unremitting pain at night  [] Yes [] No Abdominal pain or problems  [] Yes [] No Rectal bleeding  [] Yes [] No Feet more cold or painful in cold weather  [] Yes [] No Menstrual irregularities  [] Yes [] No Blood or pain with urination  [] Yes [] No Dysfunction of bowel or bladder  [] Yes [] No Recent illness within past 3 weeks (i.e, cold, flu)  [] Yes [] No Numbness/tingling in buttock/genitalia region    Past History/Treatments:     Diagnostic Tests: [] Lab work [] X-rays    [] CT [] MRI     [] Other:  Results:    Functional Status  Prior level of function:as above  Present functional limitations:FOTO  What position do you sleep in?:all positions    OBJECTIVE  Posture:decrease Right shoulder and scap levels  Lateral Shift: [x] R    [x] L     [] +  [x] -  Kyphosis: [] Increased [] Decreased   []  WNL  Lordosis:  [x] Increased [] Decreased   [] WNL  Pelvic symmetry: [] WNL    [] Other:    Gait:  [] Normal     [] Abnormal:    Active Movements: [] N/A   [] Too acute   [] Other:  ROM  AROM % PROM Comments:pain, area   Forward flexion 40-60 toes  Pain left LE    Extension 20-30 15 degrees     SB right 20-30 57 cm     SB left 20-30 59 cm     Rotation right 5-10 50%     Rotation left 5-10 50%       Repeated Movements   Effects on present pain: produces (VA), abolishes (A), increases (incr), decreases (decr), centralizes (C), peripheral (PH), no effect (NE)   Pre-Test Sx Flexion Repeated Flexion Extension Repeated Extension Repeated SBL Repeated SBR   Sitting          Standing          Lying      N/A N/A   Comments:  Side Glide:  Sustained passive positioning test:    Neuro Screen [] WNL  Myotome/Dermatome/Reflexes:  Comments:    Dural Mobility:  SLR Sitting: [] R    [] L    [] +    [] -  @ (degrees):           Supine: [] R    [] L    [] +    [] -  @ (degrees):   Slump Test: [] R    [x] L    [x] +    [] -  @ (degrees):   Prone Knee Bend: [] R    [] L    [] +    [] -     Palpation  [] Min  [x] Mod  [] Severe    Location:Left SIJ and piriformis STC and TTP   [] Min  [] Mod  [] Severe    Location:  [] Min  [] Mod  [] Severe    Location:    Strength   L(0-5) R (0-5) N/T   Hip Flexion (L1,2) 5 5 []   Knee Extension (L3,4) 5 5 []   Ankle Dorsiflexion (L4) 5 5 []   Great Toe Extension (L5)   []   Ankle Plantarflexion (S1)   []   Knee Flexion (S1,2)   []   Upper Abdominals   []   Lower Abdominals   []   Paraspinals   []   Back Rotators   []   Gluteus Rubens   []   Other   []     Special Tests  Lumbar:  Lumb.  Compression: [] Pos  [] Neg               Lumbar Distraction:   [] Pos  [] Neg    Quadrant:  [] Pos  [] Neg   [] Flex  [] Ext    Sacroilliac:  Gaenslen's: [] R    [] L    [] +    [] -     Compression: [] +    [] -     Gapping:  [] +    [] -     Thigh Thrust: [] R    [] L    [] +    [] -     Leg Length: [] +    [] -   Position:    Crests:    ASIS:    PSIS:    Sacral Sulcus:    Mobility: Standing flex:     Sitting flex:     Supine to sit:     Prone knee bend:         Hip: Raj Marion:  [] R    [] L    [] +    [] -     Scour:  [] R    [] L    [] +    [] -     Piriformis: [x] R    [x] L    [x] +    [] -    Left > right       Deficits: Anirudh's: [] R    [] L    [] +    [] -     Robert: [] R    [] L    [] +    [] -     Hamstrings 90/90:    Gastrocsoleus (to neutral): Right: Left:       Global Muscular Weakness:  Abdominals:  Quadratus Lumborum:  Paraspinals: Other:    Other tests/comments:       Pain Level (0-10 scale) post treatment: 4    ASSESSMENT/Changes in Function: Patient demonstrates the potential to make gains with improved ROM, strength, endurance/activity tolerance, functional FOTO survey score  and all within a reasonable time frame so as to increase their functional independence with ADLs and activities for carryover to improved quality of life, tolerance to household chores, work demands and community activities. Patient requires skilled Physical Therapy so as to monitor their response to and modify their treatment plan accordingly. Patient appears to be an appropriate candidate for skilled outpatient Physical Therapy. Patient will continue to benefit from skilled PT services to modify and progress therapeutic interventions, address ROM deficits, address strength deficits, analyze and address soft tissue restrictions, analyze and cue movement patterns, analyze and modify body mechanics/ergonomics, assess and modify postural abnormalities and instruct in home and community integration to attain remaining goals.      [x]  See Plan of Care  []  See progress note/recertification  []  See Discharge Summary         Progress towards goals / Updated goals:       PLAN  [x]  Upgrade activities as tolerated     [x]  Continue plan of care  []  Update interventions per flow sheet       []  Discharge due to:_  []  Other:_      Jennifer Mckeon, PT 8/10/2018  3:28 PM

## 2018-08-13 ENCOUNTER — HOSPITAL ENCOUNTER (OUTPATIENT)
Dept: PHYSICAL THERAPY | Age: 38
Discharge: HOME | End: 2018-08-13
Payer: COMMERCIAL

## 2018-08-13 PROCEDURE — 97110 THERAPEUTIC EXERCISES: CPT

## 2018-08-13 PROCEDURE — 97140 MANUAL THERAPY 1/> REGIONS: CPT

## 2018-08-13 NOTE — PROGRESS NOTES
PT DAILY TREATMENT NOTE 12    Patient Name: Bruno Hence  Date:2018  : 1980  [x]  Patient  Verified  Payor: BLUE CROSS / Plan: Heriatiya PINEDA Tello [id number] PPO / Product Type: PPO /    In time:4:33   Out time:5:20  Total Treatment Time (min): 48  Visit #: 2 of 10    Treatment Area: Radiculopathy, lumbar region [M54.16]    SUBJECTIVE  Pain Level (0-10 scale): 2-3  Any medication changes, allergies to medications, adverse drug reactions, diagnosis change, or new procedure performed?: [x] No    [] Yes (see summary sheet for update)  Subjective functional status/changes:   [] No changes reported  Some  Tingling.     OBJECTIVE    Modality rationale: decrease edema, decrease inflammation, decrease pain and increase tissue extensibility to improve the patients ability to perform ADL    Min Type Additional Details    [] Estim:  []Unatt       []IFC  []Premod                        []Other:  []w/ice   []w/heat  Position:  Location:    [] Estim: []Att    []TENS instruct  []NMES                    []Other:  []w/US   []w/ice   []w/heat  Position:  Location:    []  Traction: [] Cervical       []Lumbar                       [] Prone          []Supine                       []Intermittent   []Continuous Lbs:  [] before manual  [] after manual    []  Ultrasound: []Continuous   [] Pulsed                           []1MHz   []3MHz W/cm2:  Location:    []  Iontophoresis with dexamethasone         Location: [] Take home patch   [] In clinic   10 [x]  Ice post    []  heat  []  Ice massage  []  Laser   []  Anodyne Position:prone  Location:(B)LSP    []  Laser with stim  []  Other:  Position:  Location:    []  Vasopneumatic Device Pressure:       [] lo [] med [] hi   Temperature: [] lo [] med [] hi   [x] Skin assessment post-treatment:  [x]intact []redness- no adverse reaction    []redness  adverse reaction:      min []Eval                  []Re-Eval       30 min Therapeutic Exercise:  [x] See flow sheet :   Rationale: increase ROM and increase strength to improve the patients ability to perform ADL      min Therapeutic Activity:  []  See flow sheet :   Rationale:   to improve the patients ability to       min Neuromuscular Re-education:  []  See flow sheet :   Rationale:   to improve the patients ability to     8 min Manual Therapy:  P/A mob  (B) TSP/LSP   Rationale: decrease pain, increase ROM, increase tissue extensibility and decrease edema  to perform ADL      min Gait Training:  ___ feet with ___ device on level surfaces with ___ level of assist   Rationale: With   [x] TE   [] TA   [] neuro   [] other: Patient Education: [x] Review HEP    [] Progressed/Changed HEP based on:   [] positioning   [] body mechanics   [] transfers   [] heat/ice application    [] other:      Other Objective/Functional Measures:  Tightness  (B) LSP/TSP    Pain Level (0-10 scale) post treatment: 0    ASSESSMENT/Changes in Function: Discussed  With pt on importance  Of  Continuing to perform HEP even though he's  Feeling  Better. Patient will continue to benefit from skilled PT services to address functional mobility deficits, address ROM deficits, address strength deficits, analyze and address soft tissue restrictions, analyze and cue movement patterns and instruct in home and community integration to attain remaining goals. [x]  See Plan of Care  []  See progress note/recertification  []  See Discharge Summary         Progress towards goals / Updated goals:  Short Term Goals: To be accomplished in 5 treatments:                         1 patient will have established and be I with HEP to aid with progression of skilled PT program                         EVAL issued                         CURRENT                         2 patient will have Pain 2-3/10 to aid with increase tolerance to ADLS and activities                         EVAL 4 with Left LE S/S                         CURRENT  2-3  8/13/18     Long Term Goals:  To be accomplished in 10 treatments:                         1 patient will have Pain 1-2/10 to aid with increase tolerance to ADLS and activities                         EVAL 4 with Left LE S/S                         CURRENT                         2 patient will have FOTO improved to 77 to show significant improved tolerance to household chores                         EVAL 65                         CURRENT                         3 patient will have overall reports of 50% improvement to aid with increase tolerance to work demands, driving                         EVAL decrease tolerance to sitting and driving                         CURRENT    PLAN  []  Upgrade activities as tolerated     [x]  Continue plan of care  []  Update interventions per flow sheet       []  Discharge due to:_  []  Other:_      Jason Koroma PTA 8/13/2018  4:55 PM    Future Appointments  Date Time Provider Saadia Muñiz   8/15/2018 5:00 PM Yanique Calles PTA [facility]   8/20/2018 4:00 PM Katlin Vásquez, PT [facility]   8/22/2018 3:00 PM Yanique Cabrera PTA [facility]   8/27/2018 3:00 PM Jason Koroma PTA [facility]   8/29/2018 3:00 PM Jason Koroma PTA [facility]   9/18/2018 12:50 PM Ruthie Lira  [address]

## 2018-08-15 ENCOUNTER — HOSPITAL ENCOUNTER (OUTPATIENT)
Dept: PHYSICAL THERAPY | Age: 38
Discharge: HOME | End: 2018-08-15
Payer: COMMERCIAL

## 2018-08-15 PROCEDURE — 97140 MANUAL THERAPY 1/> REGIONS: CPT

## 2018-08-15 PROCEDURE — 97110 THERAPEUTIC EXERCISES: CPT

## 2018-08-15 NOTE — PROGRESS NOTES
PT DAILY TREATMENT NOTE     Patient Name: Ellen Godinez  Date:8/15/2018  : 1980  [x]  Patient  Verified  Payor: BLUE JAMESON / Plan: Antonia Tello [id number] PPO / Product Type: PPO /    In time:4:56  Out time:5:39  Total Treatment Time (min): 38  Visit #: 3 of 10    Treatment Area: Radiculopathy, lumbar region [M54.16]    SUBJECTIVE  Pain Level (0-10 scale): 2  Any medication changes, allergies to medications, adverse drug reactions, diagnosis change, or new procedure performed?: [x] No    [] Yes (see summary sheet for update)  Subjective functional status/changes:   [] No changes reported  I had  Some  Cramp  Earlier  But  Its  Getting better.     OBJECTIVE    Modality rationale: decrease edema, decrease inflammation, decrease pain and increase tissue extensibility to improve the patients ability to perform ADL    Min Type Additional Details    [] Estim:  []Unatt       []IFC  []Premod                        []Other:  []w/ice   []w/heat  Position:  Location:    [] Estim: []Att    []TENS instruct  []NMES                    []Other:  []w/US   []w/ice   []w/heat  Position:  Location:    []  Traction: [] Cervical       []Lumbar                       [] Prone          []Supine                       []Intermittent   []Continuous Lbs:  [] before manual  [] after manual    []  Ultrasound: []Continuous   [] Pulsed                           []1MHz   []3MHz W/cm2:  Location:    []  Iontophoresis with dexamethasone         Location: [] Take home patch   [] In clinic   10 [x]  Ice post    []  heat  []  Ice massage  []  Laser   []  Anodyne Position:prone  Location:(B) LSP    []  Laser with stim  []  Other:  Position:  Location:    []  Vasopneumatic Device Pressure:       [] lo [] med [] hi   Temperature: [] lo [] med [] hi   [x] Skin assessment post-treatment:  [x]intact []redness- no adverse reaction    []redness  adverse reaction:      min []Eval                  []Re-Eval       20 min Therapeutic Exercise: [] See flow sheet :   Rationale: increase ROM and increase strength to improve the patients ability to perform ADL     min Therapeutic Activity:  []  See flow sheet :   Rationale:   to improve the patients ability to       min Neuromuscular Re-education:  []  See flow sheet :   Rationale:   to improve the patients ability to     8 min Manual Therapy:  P/A mob  (B) TSP/LSP   Rationale: decrease pain, increase ROM, increase tissue extensibility and decrease edema  to perform ADL      min Gait Training:  ___ feet with ___ device on level surfaces with ___ level of assist   Rationale: With   [x] TE   [] TA   [] neuro   [] other: Patient Education: [x] Review HEP    [] Progressed/Changed HEP based on:   [] positioning   [] body mechanics   [] transfers   [] heat/ice application    [] other:      Other Objective/Functional Measures:  Tightness TSP/ LSP    Pain Level (0-10 scale) post treatment: 0    ASSESSMENT/Changes in Function: Overall fair response  To each there ex. Patient will continue to benefit from skilled PT services to address functional mobility deficits, address ROM deficits, address strength deficits, analyze and address soft tissue restrictions and analyze and cue movement patterns to attain remaining goals.      [x]  See Plan of Care  []  See progress note/recertification  []  See Discharge Summary         Progress towards goals / Updated goals:  Short Term Goals: To be accomplished in 5 treatments:                         1 patient will have established and be I with HEP to aid with progression of skilled PT program                         EVAL issued  [phone] patient will have Pain 2-3/10 to aid with increase tolerance to ADLS and activities                         EVAL 4 with Left LE S/S                         CURRENT  2-3  8/13/18      Long Term Goals: To be accomplished in 10 treatments:                         7 patient will have Pain 1-2/10 to aid with increase tolerance to ADLS and activities                         EVAL 4 with Left LE S/S                         CURRENT                         2 patient will have FOTO improved to 77 to show significant improved tolerance to household chores  [phone] patient will have overall reports of 50% improvement to aid with increase tolerance to work demands, driving                         EVAL decrease tolerance to sitting and driving                         CURRENT       PLAN  []  Upgrade activities as tolerated     [x]  Continue plan of care  []  Update interventions per flow sheet       []  Discharge due to:_  []  Other:_      Yanique Cabrera, PTA 8/15/2018  5:11 PM    Future Appointments  Date Time Provider aSadia Muñiz   8/20/2018 4:00 PM Wade Eng, PT [facility]   8/22/2018 3:00 PM Prema Ford, PTA [facility]   8/27/2018 3:00 PM Prema Ford, PTA [facility]   8/29/2018 3:00 PM Prema Ford, PTA [facility]   9/18/2018 12:50 PM Yudi Mukherjee  [address]

## 2018-08-20 ENCOUNTER — HOSPITAL ENCOUNTER (OUTPATIENT)
Dept: PHYSICAL THERAPY | Age: 38
Discharge: HOME | End: 2018-08-20
Payer: COMMERCIAL

## 2018-08-20 PROCEDURE — 97110 THERAPEUTIC EXERCISES: CPT

## 2018-08-20 PROCEDURE — 97140 MANUAL THERAPY 1/> REGIONS: CPT

## 2018-08-20 NOTE — PROGRESS NOTES
PT DAILY TREATMENT NOTE     Patient Name: Stefan Baumann  Date:2018  : 1980  [x]  Patient  Verified  Payor: BLUE CROSS / Plan: Antonia Tello [id number] PPO / Product Type: PPO /    In time:359  Out time:445  Total Treatment Time (min): 45  Billable 35  1:1 = 35  Visit #: 4 of 10    Treatment Area: Radiculopathy, lumbar region [M54.16]    SUBJECTIVE  Pain Level (0-10 scale): 2-3  Any medication changes, allergies to medications, adverse drug reactions, diagnosis change, or new procedure performed?: [x] No    [] Yes (see summary sheet for update)  Subjective functional status/changes:   [] No changes reported  I woke up this morning with a little bit of pain/cramping back there but it is better now.      OBJECTIVE    Modality rationale: decrease pain and increase tissue extensibility to improve the patients ability to aid with increase tolerance to ADLS and activities   Min Type Additional Details    [] Estim:  []Unatt       []IFC  []Premod                        []Other:  []w/ice   []w/heat  Position:  Location:    [] Estim: []Att    []TENS instruct  []NMES                    []Other:  []w/US   []w/ice   []w/heat  Position:  Location:    []  Traction: [] Cervical       []Lumbar                       [] Prone          []Supine                       []Intermittent   []Continuous Lbs:  [] before manual  [] after manual    []  Ultrasound: []Continuous   [] Pulsed                           []1MHz   []3MHz W/cm2:  Location:    []  Iontophoresis with dexamethasone         Location: [] Take home patch   [] In clinic   10 [x]  Ice  post   []  heat  []  Ice massage  []  Laser   []  Anodyne Position:prone  Location:B LS    []  Laser with stim  []  Other:  Position:  Location:    []  Vasopneumatic Device Pressure:       [] lo [] med [] hi   Temperature: [] lo [] med [] hi   [] Skin assessment post-treatment:  []intact []redness- no adverse reaction    []redness  adverse reaction:      min []Eval []Re-Eval       27 Min Therapeutic Exercise:  [x] See flow sheet :   Rationale: increase ROM, increase strength and improve coordination to improve the patients ability to aid with increase tolerance to ADLS and activities     min Therapeutic Activity:  []  See flow sheet :   Rationale:   to improve the patients ability to       min Neuromuscular Re-education:  []  See flow sheet :   Rationale:   to improve the patients ability to     8 min Manual Therapy:  PA mobs Lsp and pelvic rotational mobs in prone   Rationale: decrease pain, increase ROM and increase tissue extensibility to aid with increase tolerance to ADLs and activities     min Gait Training:  ___ feet with ___ device on level surfaces with ___ level of assist   Rationale: With   [] TE   [] TA   [] neuro   [] other: Patient Education: [x] Review HEP    [] Progressed/Changed HEP based on:   [] positioning   [] body mechanics   [] transfers   [] heat/ice application    [] other:      Other Objective/Functional Measures: VC exercises and tech. Pain Level (0-10 scale) post treatment: 2    ASSESSMENT/Changes in Function: tolerated well. Patient will continue to benefit from skilled PT services to modify and progress therapeutic interventions, address functional mobility deficits, address ROM deficits, address strength deficits, analyze and address soft tissue restrictions, analyze and cue movement patterns, analyze and modify body mechanics/ergonomics, assess and modify postural abnormalities and instruct in home and community integration to attain remaining goals.      [x]  See Plan of Care  []  See progress note/recertification  []  See Discharge Summary         Progress towards goals / Updated goals:  Short Term Goals: To be accomplished in 5 treatments:                         9 patient will have established and be I with HEP to aid with progression of skilled PT program  [facility] issued                         CRYSTAL   Progressing 8/20/18                         2 patient will have Pain 2-3/10 to aid with increase tolerance to ADLS and activities                         EVAL 4 with Left LE S/S                         CURRENT  2-3  8/13/18 8/20/18      Long Term Goals: To be accomplished in 10 treatments:                         0 patient will have Pain 1-2/10 to aid with increase tolerance to ADLS and activities                         EVAL 4 with Left LE S/S                         CURRENT 2-3 8/20/18                         2 patient will have FOTO improved to 77 to show significant improved tolerance to household chores                         EVAL 65  [address] next session   [phone] patient will have overall reports of 50% improvement to aid with increase tolerance to work demands, driving                         EVAL decrease tolerance to sitting and driving                         CURRENT        PLAN  [x]  Upgrade activities as tolerated     [x]  Continue plan of care  []  Update interventions per flow sheet       []  Discharge due to:_  [x]  [address], PT 8/20/2018  4:02 PM    Future Appointments  Date Time Provider Saadia Muñiz   8/22/2018 3:00 PM CHIQUI Gamboa [facility]   8/27/2018 3:00 PM Yanique Calles PTA [facility]   8/29/2018 3:00 PM CHIQUI Gamboa [facility]   9/18/2018 12:50 PM Jeff Velásquez  [address]

## 2018-08-22 ENCOUNTER — HOSPITAL ENCOUNTER (OUTPATIENT)
Dept: PHYSICAL THERAPY | Age: 38
Discharge: HOME | End: 2018-08-22
Payer: COMMERCIAL

## 2018-08-22 PROCEDURE — 97110 THERAPEUTIC EXERCISES: CPT

## 2018-08-22 NOTE — PROGRESS NOTES
PT DAILY TREATMENT NOTE     Patient Name: Ruthanna Kehr  Date:2018  : 1980  [x]  Patient  Verified  Payor: BLUE CROSS / Plan: [facility] PPO / Product Type: PPO /    In time: 3:05  Out time: 3:56   Total Treatment Time (min): 40  Visit #: 5 of 10    Treatment Area: Radiculopathy, lumbar region [M54.16]    SUBJECTIVE  Pain Level (0-10 scale): 4  Any medication changes, allergies to medications, adverse drug reactions, diagnosis change, or new procedure performed?: [x] No    [] Yes (see summary sheet for update)  Subjective functional status/changes:   [] No changes reported  I sleptt  On the  Chair  Last  Night  And  Woke  Up like  This.     OBJECTIVE    Modality rationale: decrease edema, decrease inflammation, decrease pain and increase tissue extensibility to improve the patients ability to perform ADL    Min Type Additional Details    [] Estim:  []Unatt       []IFC  []Premod                        []Other:  []w/ice   []w/heat  Position:  Location:    [] Estim: []Att    []TENS instruct  []NMES                    []Other:  []w/US   []w/ice   []w/heat  Position:  Location:    []  Traction: [] Cervical       []Lumbar                       [] Prone          []Supine                       []Intermittent   []Continuous Lbs:  [] before manual  [] after manual    []  Ultrasound: []Continuous   [] Pulsed                           []1MHz   []3MHz W/cm2:  Location:    []  Iontophoresis with dexamethasone         Location: [] Take home patch   [] In clinic   10 [x]  Ice   post  []  heat  []  Ice massage  []  Laser   []  Anodyne Position:prone  Location:(B) LSP    []  Laser with stim  []  Other:  Position:  Location:    []  Vasopneumatic Device Pressure:       [] lo [] med [] hi   Temperature: [] lo [] med [] hi   [x] Skin assessment post-treatment:  [x]intact []redness- no adverse reaction    []redness  adverse reaction:      min []Eval                  []Re-Eval       30 min Therapeutic Exercise:  [] See flow sheet :   Rationale: increase ROM and increase strength to improve the patients ability to perform ADL      min Therapeutic Activity:  []  See flow sheet :   Rationale:   to improve the patients ability to       min Neuromuscular Re-education:  []  See flow sheet :   Rationale:   to improve the patients ability to      min Manual Therapy:     Rationale: decrease pain, increase ROM, increase tissue extensibility and decrease edema  to perform ADL      min Gait Training:  ___ feet with ___ device on level surfaces with ___ level of assist   Rationale: With   [x] TE   [] TA   [] neuro   [] other: Patient Education: [x] Review HEP    [] Progressed/Changed HEP based on:   [] positioning   [] body mechanics   [] transfers   [] heat/ice application    [] other:      Other Objective/Functional Measures: FOTO:67    Pain Level (0-10 scale) post treatment: 0    ASSESSMENT/Changes in Function: FOTO improved  By  2 points. Patient will continue to benefit from skilled PT services to address functional mobility deficits, address ROM deficits, address strength deficits, analyze and address soft tissue restrictions, analyze and cue movement patterns and instruct in home and community integration to attain remaining goals.      [x]  See Plan of Care  []  See progress note/recertification  []  See Discharge Summary         Progress towards goals / Updated goals:  Short Term Goals: To be accomplished in 5 treatments:                         8 patient will have established and be I with HEP to aid with progression of skilled PT program                         EVAL issued                         [id number]   Progressing 8/20/18                         2 patient will have Pain 2-3/10 to aid with increase tolerance to ADLS and activities                         EVAL 4 with Left LE S/S                         CURRENT  2-3  8/13/18 8/20/18      Long Term Goals: To be accomplished in 10 treatments:                         7 patient will have Pain 1-2/10 to aid with increase tolerance to ADLS and activities                         EVAL 4 with Left LE S/S                         CURRENT 2-3 8/20/18                         2 patient will have FOTO improved to 77 to show significant improved tolerance to household chores                         EVAL 65                         CURRENT  67  8/22/18                         3 patient will have overall reports of 50% improvement to aid with increase tolerance to work demands, driving                         EVAL decrease tolerance to sitting and driving                         CURRENT    PLAN  []  Upgrade activities as tolerated     [x]  Continue plan of care  []  Update interventions per flow sheet       []  Discharge due to:_  []  Other:_      Yanique Cabrera, PTA 8/22/2018  3:16 PM    Future Appointments  Date Time Provider Saadia Muñiz   8/27/2018 3:00 PM [facility]   8/29/2018 3:00 PM [facility]   9/18/2018 12:50 PM Marybeth Freeman  [address]

## 2018-08-27 ENCOUNTER — HOSPITAL ENCOUNTER (OUTPATIENT)
Dept: PHYSICAL THERAPY | Age: 38
Discharge: HOME | End: 2018-08-27
Payer: COMMERCIAL

## 2018-08-27 PROCEDURE — 97110 THERAPEUTIC EXERCISES: CPT

## 2018-08-27 NOTE — PROGRESS NOTES
PT DAILY TREATMENT NOTE - [facility]     Patient Name: Hansel Cover  Date:2018  : 1980  [x]  Patient  Verified  Payor: BLUE CROSS / Plan: Heriatiya Hurds [id number] PPO / Product Type: PPO /    In time:2:59  Out time:3:40  Total Treatment Time (min): 41  Total Timed Codes (min): 31  1:1 Treatment Time ( only):  31  Visit #: 6 of 10    Treatment Area: Radiculopathy, lumbar region [M54.16]    SUBJECTIVE  Pain Level (0-10 scale): 0  Any medication changes, allergies to medications, adverse drug reactions, diagnosis change, or new procedure performed?: [x] No    [] Yes (see summary sheet for update)  Subjective functional status/changes:   [] No changes reported  No pain.     OBJECTIVE    Modality rationale: decrease edema, decrease inflammation, decrease pain and increase tissue extensibility to improve the patients ability to perform ADL    Min Type Additional Details    [] Estim:  []Unatt       []IFC  []Premod                        []Other:  []w/ice   []w/heat  Position:  Location:    [] Estim: []Att    []TENS instruct  []NMES                    []Other:  []w/US   []w/ice   []w/heat  Position:  Location:    []  Traction: [] Cervical       []Lumbar                       [] Prone          []Supine                       []Intermittent   []Continuous Lbs:  [] before manual  [] after manual    []  Ultrasound: []Continuous   [] Pulsed                           []1MHz   []3MHz W/cm2:  Location:    []  Iontophoresis with dexamethasone         Location: [] Take home patch   [] In clinic   10 [x]  Ice post    []  heat  []  Ice massage  []  Laser   []  Anodyne Position:prone  Location:(B) LSP    []  Laser with stim  []  Other:  Position:  Location:    []  Vasopneumatic Device Pressure:       [] lo [] med [] hi   Temperature: [] lo [] med [] hi   [x] Skin assessment post-treatment:  [x]intact []redness- no adverse reaction    []redness  adverse reaction:      min []Eval                  []Re-Eval       31 min Therapeutic Exercise:  [] See flow sheet :   Rationale: increase ROM and increase strength to improve the patients ability to perform ADL      min Therapeutic Activity:  []  See flow sheet :   Rationale:   to improve the patients ability to       min Neuromuscular Re-education:  []  See flow sheet :   Rationale:   to improve the patients ability to      min Manual Therapy:     Rationale: decrease pain, increase ROM, increase tissue extensibility and decrease edema  to perform ADL      min Gait Training:  ___ feet with ___ device on level surfaces with ___ level of assist   Rationale: With   [x] TE   [] TA   [] neuro   [] other: Patient Education: [x] Review HEP    [] Progressed/Changed HEP based on:   [] positioning   [] body mechanics   [] transfers   [] heat/ice application    [] other:      Other Objective/Functional Measures: increased  Rep  Per  Flow  sheet    Pain Level (0-10 scale) post treatment: 0    ASSESSMENT/Changes in Function: Completed   Each there ex  Fairly  Well. Patient will continue to benefit from skilled PT services to address functional mobility deficits, address ROM deficits, address strength deficits, analyze and address soft tissue restrictions and instruct in home and community integration to attain remaining goals.      [x]  See Plan of Care  []  See progress note/recertification  []  See Discharge Summary         Progress towards goals / Updated goals:  Short Term Goals: To be accomplished in 5 treatments:                         5 patient will have established and be I with HEP to aid with progression of skilled PT program                         EVAL issued                         NGJUACN   Progressing 8/20/18                         2 patient will have Pain 2-3/10 to aid with increase tolerance to ADLS and activities                         EVAL 4 with Left LE S/S                         CURRENT  2-3  8/13/18 8/20/18    0  8/27/18      Long Term Goals: To be accomplished in 10 treatments:                         5 patient will have Pain 1-2/10 to aid with increase tolerance to ADLS and activities                         EVAL 4 with Left LE S/S                         CURRENT 2-3 8/20/18                         2 patient will have FOTO improved to 77 to show significant improved tolerance to household chores  [phone]  8/22/18                         3 patient will have overall reports of 50% improvement to aid with increase tolerance to work demands, driving                         EVAL decrease tolerance to sitting and driving                         CURRENT    PLAN  []  Upgrade activities as tolerated     []  Continue plan of care  []  Update interventions per flow sheet       []  Discharge due to:_  [x]  Other:_  Trial  TM/Jr Cabrera, CHIQUI 8/27/2018  3:06 PM    Future Appointments  Date Time Provider Saadia Muñiz   8/29/2018 3:00 PM Quyen Gamez [facility]   9/18/2018 12:50 PM Zafar Hirsch  [address]

## 2018-08-29 ENCOUNTER — HOSPITAL ENCOUNTER (OUTPATIENT)
Dept: PHYSICAL THERAPY | Age: 38
Discharge: HOME | End: 2018-08-29
Payer: COMMERCIAL

## 2018-08-29 PROCEDURE — 97110 THERAPEUTIC EXERCISES: CPT

## 2018-08-29 NOTE — PROGRESS NOTES
PT DAILY TREATMENT NOTE - [facility]  Patient Name: Lianna Farmer Date:2018 : 1980 [x]  Patient  Verified Payor: BLUE CROSS / Plan: [facility] PPO / Product Type: PPO / In time: 3:04   Out time: 3:44 Total Treatment Time (min): 40 Total Timed Codes (min): 40  
1:1 Treatment Time ([facility] only): 24 Visit #: 7 of 10 Treatment Area: Radiculopathy, lumbar region [M54.16] SUBJECTIVE Pain Level (0-10 scale): 0 Any medication changes, allergies to medications, adverse drug reactions, diagnosis change, or new procedure performed?: [x] No    [] Yes (see summary sheet for update) Subjective functional status/changes:   [] No changes reported Pt reports that his leg pain is a lot better. He reports having some pain the the low back at times but it is not as intense or frequent. OBJECTIVE 40 min Therapeutic Exercise:  [] See flow sheet :  
Rationale: increase ROM and increase strength to improve the patients ability to perform ADLs with more ease With 
 [x] TE 
 [] TA 
 [] neuro 
 [] other: Patient Education: [x] Review HEP [] Progressed/Changed HEP based on:  
[] positioning   [] body mechanics   [] transfers   [] heat/ice application   
[] other:   
 
Other Objective/Functional Measures: Added TM today for 6 mins to improve endurance and core stability. Pain Level (0-10 scale) post treatment: 0 
 
ASSESSMENT/Changes in Function: Reported no pain post session and denied CP post session as well. Pt seems to be responding well to therapy interventions at this time with improvement in overall pain and symptoms. Educated pt that his low back pain that he is feeling at times (see subjective above) may be from centralization of peripheral symptoms. Pt reported no pain with TM as well. Continue POC as tolerated.   
 
Patient will continue to benefit from skilled PT services to modify and progress therapeutic interventions, address functional mobility deficits, address ROM deficits, address strength deficits, analyze and address soft tissue restrictions, analyze and cue movement patterns, analyze and modify body mechanics/ergonomics, assess and modify postural abnormalities and instruct in home and community integration to attain remaining goals. []  See Plan of Care 
[]  See progress note/recertification 
[]  See Discharge Summary Progress towards goals / Updated goals: 
Short Term Goals: To be accomplished in 5 treatments: 
                       3 patient will have established and be I with HEP to aid with progression of skilled PT program 
                       EVAL issued 
                       ICLGEXN   Progressing 8/20/18 
                       2 patient will have Pain 2-3/10 to aid with increase tolerance to ADLS and activities                        EVAL 4 with Left LE S/S 
                       CURRENT  2-3  8/13/18 8/20/18    0  8/27/18 Long Term Goals: To be accomplished in 10 treatments: 
                       6 patient will have Pain 1-2/10 to aid with increase tolerance to ADLS and activities                        EVAL 4 with Left LE S/S 
                       CURRENT 2-3 8/20/18 
                       2 patient will have FOTO improved to 77 to show significant improved tolerance to household chores                        EVAL 65 
                       CURRENT  67  8/22/18 
                       3 patient will have overall reports of 50% improvement to aid with increase tolerance to work demands, driving 
                       EVAL decrease tolerance to sitting and driving 
                       CURRENT 
 
PLAN [x]  Upgrade activities as tolerated     [x]  Continue plan of care [x]  Update interventions per flow sheet      
[]  Discharge due to:_ 
[]  Other:_ Corinne Do, PT 8/29/2018  3:32 PM 
 
Future Appointments Date Time Provider Saadia Muñiz 9/18/2018 12:50 PM Marybeth Freeman  [address]

## 2018-09-18 ENCOUNTER — OFFICE VISIT (OUTPATIENT)
Dept: ORTHOPEDIC SURGERY | Age: 38
End: 2018-09-18

## 2018-09-18 VITALS
DIASTOLIC BLOOD PRESSURE: 81 MMHG | HEIGHT: 69 IN | BODY MASS INDEX: 34.66 KG/M2 | HEART RATE: 62 BPM | WEIGHT: 234 LBS | SYSTOLIC BLOOD PRESSURE: 128 MMHG | TEMPERATURE: 98.3 F | RESPIRATION RATE: 16 BRPM | OXYGEN SATURATION: 100 %

## 2018-09-18 DIAGNOSIS — M54.16 LUMBAR RADICULOPATHY: Primary | ICD-10-CM

## 2018-09-18 RX ORDER — GABAPENTIN 300 MG/1
600 CAPSULE ORAL 3 TIMES DAILY
Qty: 180 CAP | Refills: 2 | Status: SHIPPED | OUTPATIENT
Start: 2018-09-18 | End: 2021-06-23

## 2018-09-18 NOTE — MR AVS SNAPSHOT
[address] [place] [address] [address] [place] [address] 
[phone] Patient: Nathaniel Clements MRN: [id number] Anamaria Gladys Visit Information Date & Time Provider Department Dept. Phone Encounter #  
 9/18/2018 12:50 PM Krystin Padilla MD [facility] [phone] [id number] Follow-up Instructions Return after MRI. Upcoming Health Maintenance Date Due Pneumococcal 19-64 Medium Risk (1 of 1 - PPSV23) 8/25/2001 DTaP/Tdap/Td series (1 - Tdap) 8/25/2003 Influenza Age 5 to Adult 8/1/2018 Allergies as of 9/18/2018  Review Complete On: 9/18/2018 By: Mandeep Lovell LPN No Known Allergies Current Immunizations  Never Reviewed No immunizations on file. Not reviewed this visit You Were Diagnosed With   
  
 Codes Comments Lumbar radiculopathy    -  Primary ICD-10-CM: M54.16 
ICD-9-CM: 724.4 Vitals BP Pulse Temp Resp Height(growth percentile) Weight(growth percentile) 128/81 (BP 1 Location: Right arm, BP Patient Position: Sitting) 62 98.3 °F (36.8 °C) (Oral) 16 5' 9\" (1.753 m) 234 lb (106.1 kg) SpO2 BMI Smoking Status 100% 34.56 kg/m2 Current Some Day Smoker BMI and BSA Data Body Mass Index Body Surface Area 34.56 kg/m 2 2.27 m 2 Preferred Pharmacy Pharmacy Name Phone [address] [place] [phone] Your Updated Medication List  
  
   
This list is accurate as of 9/18/18  1:38 PM.  Always use your most recent med list.  
  
  
  
  
 diclofenac EC 75 mg EC tablet Commonly known as:  VOLTAREN Take 1 Tab by mouth two (2) times daily as needed. famotidine 40 mg tablet Commonly known as:  PEPCID Take 40 mg by mouth daily. * gabapentin 300 mg capsule Commonly known as:  NEURONTIN Take 1 Cap by mouth three (3) times daily. * gabapentin 300 mg capsule Commonly known as:  NEURONTIN Take 2 Caps by mouth three (3) times daily. losartan-hydroCHLOROthiazide 100-25 mg per tablet Commonly known as:  HYZAAR Take 1 Tab by mouth daily. meloxicam 15 mg tablet Commonly known as:  MOBIC Take 1 Tab by mouth daily (with breakfast). * methocarbamol 500 mg tablet Commonly known as:  ROBAXIN Take 1 Tab by mouth three (3) times daily. * methocarbamol 500 mg tablet Commonly known as:  ROBAXIN Take 1 Tab by mouth three (3) times daily. raNITIdine 150 mg tablet Commonly known as:  ZANTAC Take 150 mg by mouth two (2) times a day. * Notice: This list has 4 medication(s) that are the same as other medications prescribed for you. Read the directions carefully, and ask your doctor or other care provider to review them with you. Prescriptions Sent to Pharmacy Refills  
 gabapentin (NEURONTIN) 300 mg capsule 2 Sig: Take 2 Caps by mouth three (3) times daily. Class: Normal  
 Pharmacy: [address] [place] [address] [address] [facility] #: [phone] Route: Oral  
  
Follow-up Instructions Return after MRI. To-Do List   
 09/25/2018 Imaging:  MRI LUMB SPINE WO CONT Patient Instructions Gabapentin (Neurontin) instructions: We will increase your current dose from 300 mg (1 pill) three times a day to 600 mg (2 pills) three times a day. Morning Afternoon Night Week 1 1 pill 1 pill 2 pills Week 2 2 pills 1 pill 2 pills Week 3 and onwards 2 pills 2 pills 2 pills Week 1: Take an additional 300 mg pill at night to your current dose so that you are taking 1 pill in the morning, 1 pill in the afternoon, and 2 pills at night. Week 2: Take an additional 300 mg pill in the morning so that you are taking 2 pills in the morning, 1 pill in the afternoon, and 2 pills at night. Week 3 and onwards: Take an additional 300 mg pill in the afternoon so that you are taking 2 pills in the morning, 2 pills in the afternoon, and 2 pills at night. Continue taking this dose each day. Introducing [facility] SERVICES! Dear Jamaal Stoddard: Thank you for requesting a [employer] account. Our records indicate that you already have an active [employer] account. You can access your account anytime at [email or web address] Did you know that you can access your hospital and ER discharge instructions at any time in [employer]? You can also review all of your test results from your hospital stay or ER visit. Additional Information If you have questions, please visit the Frequently Asked Questions section of the [employer] website at [email or web address] Remember, [employer] is NOT to be used for urgent needs. For medical emergencies, dial 911. Now available from your iPhone and Android! Please provide this summary of care documentation to your next provider. Your primary care clinician is listed as [address]. If you have any questions after today's visit, please call [phone].

## 2018-09-18 NOTE — PROGRESS NOTES
[address]  [facility], [address]  Phone: [phone]  Fax: [phone]        Oscar Fried  : 1980  PCP: Kvng Epstein MD  2018    PROGRESS NOTE      ASSESSMENT AND PLAN    Liza Botello comes in to the office today for PT and medication f/u. He reports that he has seen some improvement of his low back pain and his LLE paraesthesia. He reports that he has episodic paraesthesia in his LLE. He has been tolerating the Gabapentin well, and he saw some improvement from the Prednisone dose pack. He rates his pain as a 4/10 today. His pain likely remains due to an L5/S1 lumbar radiculopathy. I increased his Gabapentin to 600mg TID. I also referred for a lumbar MRI. Pt will f/u after MRI or sooner as needed. Diagnoses and all orders for this visit:    1. Lumbar radiculopathy  -     MRI LUMB SPINE WO CONT; Future  -     gabapentin (NEURONTIN) 300 mg capsule; Take 2 Caps by mouth three (3) times daily. Follow-up Disposition: Not on File      HISTORY OF PRESENT ILLNESS  Liza Botello is a 41 y.o. male. A&P / HPI from 2017:  Shantell Vierax in to the office today c/o 10/10 aching lumbar pain that radiates down his lateral left leg to the bottom of his foot x 1 month. He took Prednisone 3x 60 mg tablets once with good relief and states that his radiating left leg pain subsided after it. His pain was likely due to a left L5 or S1 radiculopathy which was relieved with the Prednisone.      He was referred to [facility] therapy with workplace conditioning. We discussed another Prednisone dose pack should his pain worsen. Pt was prescribed diclofenac 75mg BID prn.  The risks, benefits, and potential side effects of this medication were discussed.        He was given a work note to place him on medium duty with a 30 lb weight restriction.      Pt will f/u in 6 weeks or sooner if needed.      Updates from 07/05/17:  Pt presents for left L5 or S1 radiculopathy f/u. The pain has improved with PT and medications.         Updates from 08/07/18:  Pt presents for an exacerbation of his low back pain radiating into his LLE in an S1 distribution. Updates from 09/18/18:  Pt presents for PT f/u. He saw some improvement of his low back pain and LLE paraesthesia with PT, Gabapentin 300mg TID, and a Prednisone dose pack. He continues to have episodic paraesthesia in the posterior aspect of his LLE. PAST MEDICAL HISTORY   No past medical history on file. No past surgical history on file. [facility] MEDICATIONS    Current Outpatient Prescriptions   Medication Sig Dispense Refill    gabapentin (NEURONTIN) 300 mg capsule Take 1 Cap by mouth three (3) times daily. 90 Cap 2    predniSONE (STERAPRED DS) 10 mg dose pack See administration instruction per 10mg dose pack 21 Tab 0    methocarbamol (ROBAXIN) 500 mg tablet Take 1 Tab by mouth three (3) times daily. 12 Tab 0    diclofenac EC (VOLTAREN) 75 mg EC tablet Take 1 Tab by mouth two (2) times daily as needed. 60 Tab 5    methocarbamol (ROBAXIN) 500 mg tablet Take 1 Tab by mouth three (3) times daily. 20 Tab 0    losartan-hydroCHLOROthiazide (HYZAAR) 100-25 mg per tablet Take 1 Tab by mouth daily.  famotidine (PEPCID) 40 mg tablet Take 40 mg by mouth daily.  meloxicam (MOBIC) 15 mg tablet Take 1 Tab by mouth daily (with breakfast). 30 Tab 1    ranitidine (ZANTAC) 150 mg tablet Take 150 mg by mouth two (2) times a day.           ALLERGIES  No Known Allergies       SOCIAL HISTORY    Social History     Social History    Marital status: UNKNOWN     Spouse name: N/A    Number of children: N/A    Years of education: N/A     Social History Main Topics    Smoking status: Current Some Day Smoker     Packs/day: 0.50    Smokeless tobacco: Never Used    Alcohol use Yes    Drug use: No    Sexual activity: Not on file     Other Topics Concern    Not on file     Social History Narrative       FAMILY HISTORY  No family history on file. REVIEW OF SYSTEMS  Review of Systems   Musculoskeletal: Positive for back pain. LLE paraesthesia          PHYSICAL EXAMINATION  There were no vitals taken for this visit. Pain Assessment  8/7/2018   Location of Pain Back   Location Modifiers -   Severity of Pain 10   Quality of Pain Aching; Other (Comment)   Quality of Pain Comment pulling, cramping   Duration of Pain Persistent   Frequency of Pain Constant   Aggravating Factors Walking;Bending   Limiting Behavior -   Relieving Factors -           Constitutional:  Well developed, well nourished, in no acute distress. Psychiatric: Affect and mood are appropriate. Integumentary: No rashes or abrasions noted on exposed areas. SPINE/MUSCULOSKELETAL EXAM    Cervical spine:  Neck is midline. Normal muscle tone. No focal atrophy is noted. ROM pain free. Shoulder ROM intact. Mild tenderness to palpation. Negative Spurling's sign. Negative Tinel's sign. Negative Kirby's sign.       Sensation in the bilateral arms grossly intact to light touch.       Lumbar spine:  No rash, ecchymosis, or gross obliquity. No fasciculations. No focal atrophy is noted. No pain with hip ROM. Full range of motion. Tenderness to palpation L>R. No tenderness to palpation at the sciatic notch. SI joints non-tender. Trochanters non tender.   Pain with back extension > flexion.      Sensation in the bilateral legs grossly intact to light touch.     Updates from 8/7/18:  Positive SLR on the L  Otherwise, neurologically intact    MOTOR:      Biceps  Triceps Deltoids Wrist Ext Wrist Flex Hand Intrin   Right 5/5 5/5 5/5 5/5 5/5 5/5   Left 5/5 5/5 5/5 5/5 5/5 5/5             Hip Flex  Quads Hamstrings Ankle DF EHL Ankle PF   Right 5/5 5/5 5/5 5/5 5/5 5/5   Left 5/5 5/5 5/5 5/5 5/5 5/5     DTRs are 2+ biceps, triceps, brachioradialis, patella, and Achilles.      Negative Straight Leg raise.   Squat not tested. No difficulty with tandem gait.       Ambulation without assistive device. FWB.       RADIOGRAPHS  Lumbar XR images taken on 5/12/2017 personally reviewed with patient:  FINDINGS:      The study shows mild dextroscoliosis in thoracolumbar spine.      Lumbar vertebral bodies are of normal heights and in normal alignment is. There  is no evidence of fracture or compression in lumbar spine.      The study shows minimal degenerative disc disease at L3/L4 and L2/L3 levels. At  L4/L5 and L5-S1 levels there are mild facet osteoarthritic changes.      IMPRESSION  IMPRESSION:      No evidence of fracture, vertebral body compression or malalignment in lumbar  spine or in visualized sacrum.      Minimal degenerative disc disease in lumbar spine.      Mild facet osteoarthritic changes in lower lumbar spine and lumbosacral  junction. 7 minutes of face-to-face contact were spent with the patient during today's visit extensively discussing symptoms and treatment plan. All questions were answered. More than half of this visit today was spent on counseling.      Written by Caren Romero as dictated by Sybil Hull MD

## 2018-09-26 ENCOUNTER — HOSPITAL ENCOUNTER (OUTPATIENT)
Age: 38
Discharge: HOME | End: 2018-09-26
Attending: PHYSICAL MEDICINE & REHABILITATION
Payer: COMMERCIAL

## 2018-09-26 DIAGNOSIS — M54.16 LUMBAR RADICULOPATHY: ICD-10-CM

## 2018-09-26 PROCEDURE — 72148 MRI LUMBAR SPINE W/O DYE: CPT

## 2018-10-08 NOTE — PROGRESS NOTES
[address], [address] [place], [address] [place] [address],[facility] 300 [phone] fax Discharge Summary Patient name: Dexter Castro     Start of Care: 8/10/18 Referral source: Rachana Suh MD    : 1980 Medical/Treatment Diagnosis: Radiculopathy, lumbar region [M54.16]  Onset Date:2018 Prior Hospitalization: see medical history   Provider#: [id number] Comorbidities: HTN, previous episode of similar involvement Prior Level of Function: I all areas, no AD used, ADLs and activities, no issues with work demands, household chores 
  Medications: Verified on Patient Summary List 
 
Visits from Start of Care: 7    Missed Visits: 0 Reporting Period : 8/10/18 to 18 Summary of Care:Patient seen for 7 skilled sessions. Residual pain was 0 and FOTO improved to 67. He had exercises for his HEP and should follow up with the MD as needed. Thank you. Goal:patient will have established and be I with HEP to aid with progression of skilled PT program             
Status at last note/certification:eval 
Status at discharge: met Goal:patient will have Pain 2-3/10 to aid with increase tolerance to ADLS and activities Status at last note/certification:eval 
Status at discharge: met Goal:patient will have Pain 1-2/10 to aid with increase tolerance to ADLS and activities Status at last note/certification:eval 
Status at discharge: met Goal: patient will have FOTO improved to 77 to show significant improved tolerance to household chores Status at last note/certification:eval 
Status at discharge: not met ASSESSMENT/RECOMMENDATIONS: 
[]Discontinue therapy progressing towards or have reached established goals []Discontinue therapy due to lack of appreciable progress towards goals [x]Discontinue therapy due to lack of attendance or compliance [x]Other:  
 
Thank you for this referral.  
 
 Trinidad Tadeo, PT 10/8/2018 5:45 PM

## 2018-11-06 ENCOUNTER — OFFICE VISIT (OUTPATIENT)
Dept: ORTHOPEDIC SURGERY | Age: 38
End: 2018-11-06

## 2018-11-06 VITALS
OXYGEN SATURATION: 100 % | DIASTOLIC BLOOD PRESSURE: 80 MMHG | BODY MASS INDEX: 34.78 KG/M2 | WEIGHT: 234.8 LBS | RESPIRATION RATE: 15 BRPM | HEART RATE: 48 BPM | SYSTOLIC BLOOD PRESSURE: 133 MMHG | TEMPERATURE: 97.8 F | HEIGHT: 69 IN

## 2018-11-06 DIAGNOSIS — M79.2 NEURITIS: ICD-10-CM

## 2018-11-06 DIAGNOSIS — M51.36 ANNULAR TEAR OF LUMBAR DISC: Primary | ICD-10-CM

## 2018-11-06 RX ORDER — DICLOFENAC SODIUM 75 MG/1
75 TABLET, DELAYED RELEASE ORAL 2 TIMES DAILY
Qty: 60 TAB | Refills: 5 | Status: SHIPPED | OUTPATIENT
Start: 2018-11-06 | End: 2021-06-23

## 2018-11-06 NOTE — PROGRESS NOTES
[address], [address]  [place]  Phone: [phone]  Fax: [phone]        Werner   : 1980  PCP: Sandip Eduardo MD  2018    PROGRESS NOTE      ASSESSMENT AND PLAN    Hugo Yan comes in to the office today for MRI f/u. He continues to see improvement of his low back pain radiating into his LLE. He is tolerating Gabapentin 600mg TID. He rates his pain as a 0/10 today. His lumbar MRI reveals an L5-S1 disc protrusion with a sizable annular tear with mass effect on the crossing left S1 nerve root. His pain is likely associated with the annular tear evidenced on his MRI resulting in L5/S1 neuritis. I advised he continue taking Gabapentin 600mg TID and maintaining an HEP including Neto exercises. I advised on proper posture and maintaining a straight back. He can call if he would like to proceed with an injection. Pt will f/u in 6 months or sooner as needed. Diagnoses and all orders for this visit:    1. Annular tear of lumbar disc  -     diclofenac EC (VOLTAREN) 75 mg EC tablet; Take 1 Tab by mouth two (2) times a day. 2. Neuritis  -     diclofenac EC (VOLTAREN) 75 mg EC tablet; Take 1 Tab by mouth two (2) times a day. Follow-up Disposition: Not on File      HISTORY OF PRESENT ILLNESS  Hugo Yan is a 41 y.o. male. A&P / HPI from 2017:  Harpreet Chester in to the office today c/o 10/10 aching lumbar pain that radiates down his lateral left leg to the bottom of his foot x 1 month. He took Prednisone 3x 60 mg tablets once with good relief and states that his radiating left leg pain subsided after it. His pain was likely due to a left L5 or S1 radiculopathy which was relieved with the Prednisone.      He was referred to [facility] with workplace conditioning. We discussed another Prednisone dose pack should his pain worsen. Pt was prescribed diclofenac 75mg BID prn. The risks, benefits, and potential side effects of this medication were discussed.        He was given a work note to place him on medium duty with a 30 lb weight restriction.      Pt will f/u in 6 weeks or sooner if needed.      Updates from 07/05/17:  Pt presents for left L5 or S1 radiculopathy f/u. The pain has improved with PT and medications.         Updates from 08/07/18:  Pt presents for an exacerbation of his low back pain radiating into his LLE in an S1 distribution.     Updates from 09/18/18:  Pt presents for PT f/u. He saw some improvement of his low back pain and LLE paraesthesia with PT, Gabapentin 300mg TID, and a Prednisone dose pack. He continues to have episodic paraesthesia in the posterior aspect of his LLE. Updates from 11/06/18:  Pt presents for MRI f/u. He continues to see improvement of his low back pain radiating into his LLE. PAST MEDICAL HISTORY   No past medical history on file. No past surgical history on file. Stefania Dinh MEDICATIONS    Current Outpatient Medications   Medication Sig Dispense Refill    gabapentin (NEURONTIN) 300 mg capsule Take 2 Caps by mouth three (3) times daily. 180 Cap 2    gabapentin (NEURONTIN) 300 mg capsule Take 1 Cap by mouth three (3) times daily. 90 Cap 2    methocarbamol (ROBAXIN) 500 mg tablet Take 1 Tab by mouth three (3) times daily. 12 Tab 0    diclofenac EC (VOLTAREN) 75 mg EC tablet Take 1 Tab by mouth two (2) times daily as needed. 60 Tab 5    methocarbamol (ROBAXIN) 500 mg tablet Take 1 Tab by mouth three (3) times daily. 20 Tab 0    losartan-hydroCHLOROthiazide (HYZAAR) 100-25 mg per tablet Take 1 Tab by mouth daily.  famotidine (PEPCID) 40 mg tablet Take 40 mg by mouth daily.  meloxicam (MOBIC) 15 mg tablet Take 1 Tab by mouth daily (with breakfast). 30 Tab 1    ranitidine (ZANTAC) 150 mg tablet Take 150 mg by mouth two (2) times a day.           ALLERGIES  No Known Allergies       SOCIAL HISTORY    Social History Socioeconomic History    Marital status: UNKNOWN     Spouse name: Not on file    Number of children: Not on file    Years of education: Not on file    Highest education level: Not on file   Social Needs    Financial resource strain: Not on file    Food insecurity - worry: Not on file    Food insecurity - inability: Not on file    Transportation needs - medical: Not on file   [place] needs - non-medical: Not on file   Occupational History    Not on file   Tobacco Use    Smoking status: Current Some Day Smoker     Packs/day: 0.50    Smokeless tobacco: Never Used   Substance and Sexual Activity    Alcohol use: Yes    Drug use: No    Sexual activity: Not on file   Other Topics Concern    Not on file   Social History Narrative    Not on file       FAMILY HISTORY  No family history on file. REVIEW OF SYSTEMS  Review of Systems   Musculoskeletal: Positive for back pain. LLE paraesthesia          PHYSICAL EXAMINATION  There were no vitals taken for this visit. Pain Assessment  9/18/2018   Location of Pain Back   Location Modifiers -   Severity of Pain 4   Quality of Pain Aching   Quality of Pain Comment -   Duration of Pain -   Frequency of Pain Intermittent   Aggravating Factors -   Limiting Behavior -   Relieving Factors -           Constitutional:  Well developed, well nourished, in no acute distress. Psychiatric: Affect and mood are appropriate. Integumentary: No rashes or abrasions noted on exposed areas. SPINE/MUSCULOSKELETAL EXAM    Cervical spine:  Neck is midline. Normal muscle tone. No focal atrophy is noted. ROM pain free. Shoulder ROM intact. Mild tenderness to palpation. Negative Spurling's sign. Negative Tinel's sign. Negative Kirby's sign.       Sensation in the bilateral arms grossly intact to light touch.       Lumbar spine:  No rash, ecchymosis, or gross obliquity. No fasciculations. No focal atrophy is noted.    No pain with hip ROM.   Full range of motion. Tenderness to palpation L>R. No tenderness to palpation at the sciatic notch. SI joints non-tender. Trochanters non tender. Pain with back extension > flexion.      Sensation in the bilateral legs grossly intact to light touch.     Updates from 8/7/18:  Positive SLR on the L  Otherwise, neurologically intact    MOTOR:      Biceps  Triceps Deltoids Wrist Ext Wrist Flex Hand Intrin   Right 5/5 5/5 5/5 5/5 5/5 5/5   Left 5/5 5/5 5/5 5/5 5/5 5/5             Hip Flex  Quads Hamstrings Ankle DF EHL Ankle PF   Right 5/5 5/5 5/5 5/5 5/5 5/5   Left 5/5 5/5 5/5 5/5 5/5 5/5     DTRs are 2+ biceps, triceps, brachioradialis, patella, and Achilles.      Negative Straight Leg raise. Squat not tested. No difficulty with tandem gait.       Ambulation without assistive device. FWB.       RADIOGRAPHS  Lumbar MRI images taken on 9/26/18 personally reviewed with patient:  FINDINGS:      Alignment: Mild degenerative L5 retrolisthesis  Vertebral body height: Normal  Marrow signal: Unremarkable  Disc spaces: Moderate narrowing and desiccation of L5-S1  Conus: Terminates at T12-L1     Axial imaging correlation:           L1-2: Patent canal and foramina.     L2-3: Patent canal and foramina.     L3-4: Patent canal and foramina.     L4-5: Patent canal and foramina.     L5-S1: Broad-based disc protrusion with apex slightly to left of midline. Sizable annular tear as a component. There is slight mass effect on the crossing  left S1 nerve, without overt impingement. Mild facet arthropathy. Minor spinal  stenosis. Mild to moderate left and mild right foraminal stenosis.     Other structures: Unremarkable.        IMPRESSION  IMPRESSION:     1. L5-S1 disc protrusion with slight mass effect on the crossing left S1 nerve. -Mild spinal stenosis as well as some foraminal stenosis at this level.  No overt  L5/S1 nerve impingement, but potential for such.     Lumbar XR images taken on 5/12/2017 personally reviewed with patient:  FINDINGS:      The study shows mild dextroscoliosis in thoracolumbar spine.      Lumbar vertebral bodies are of normal heights and in normal alignment is. There  is no evidence of fracture or compression in lumbar spine.      The study shows minimal degenerative disc disease at L3/L4 and L2/L3 levels. At  L4/L5 and L5-S1 levels there are mild facet osteoarthritic changes.      IMPRESSION  IMPRESSION:      No evidence of fracture, vertebral body compression or malalignment in lumbar  spine or in visualized sacrum.      Minimal degenerative disc disease in lumbar spine.      Mild facet osteoarthritic changes in lower lumbar spine and lumbosacral  junction. 12 minutes of face-to-face contact were spent with the patient during today's visit extensively discussing symptoms and treatment plan. All questions were answered. More than half of this visit today was spent on counseling.      Written by Brice Lynn as dictated by Sagrario Pritchard MD

## 2019-05-06 ENCOUNTER — OFFICE VISIT (OUTPATIENT)
Dept: ORTHOPEDIC SURGERY | Age: 39
End: 2019-05-06

## 2019-05-06 VITALS
TEMPERATURE: 98.8 F | RESPIRATION RATE: 18 BRPM | DIASTOLIC BLOOD PRESSURE: 99 MMHG | WEIGHT: 237.4 LBS | HEIGHT: 69 IN | SYSTOLIC BLOOD PRESSURE: 143 MMHG | BODY MASS INDEX: 35.16 KG/M2 | OXYGEN SATURATION: 100 % | HEART RATE: 48 BPM

## 2019-05-06 DIAGNOSIS — E66.01 SEVERE OBESITY (HCC): ICD-10-CM

## 2019-05-06 DIAGNOSIS — M54.59 MECHANICAL LOW BACK PAIN: ICD-10-CM

## 2019-05-06 DIAGNOSIS — M51.36 ANNULAR TEAR OF LUMBAR DISC: Primary | ICD-10-CM

## 2019-05-06 DIAGNOSIS — M79.18 MYOFASCIAL PAIN: ICD-10-CM

## 2019-05-06 DIAGNOSIS — M79.2 NEURITIS: ICD-10-CM

## 2019-05-06 NOTE — PATIENT INSTRUCTIONS
Back Stretches: Exercises  Your Care Instructions  Here are some examples of exercises for stretching your back. Start each exercise slowly. Ease off the exercise if you start to have pain. Your doctor or physical therapist will tell you when you can start these exercises and which ones will work best for you. How to do the exercises  Overhead stretch    1. Stand comfortably with your feet shoulder-width apart. 2. Looking straight ahead, raise both arms over your head and reach toward the ceiling. Do not allow your head to tilt back. 3. Hold for 15 to 30 seconds, then lower your arms to your sides. 4. Repeat 2 to 4 times. Side stretch    1. Stand comfortably with your feet shoulder-width apart. 2. Raise one arm over your head, and then lean to the other side. 3. Slide your hand down your leg as you let the weight of your arm gently stretch your side muscles. Hold for 15 to 30 seconds. 4. Repeat 2 to 4 times on each side. Press-up    1. Lie on your stomach, supporting your body with your forearms. 2. Press your elbows down into the floor to raise your upper back. As you do this, relax your stomach muscles and allow your back to arch without using your back muscles. As your press up, do not let your hips or pelvis come off the floor. 3. Hold for 15 to 30 seconds, then relax. 4. Repeat 2 to 4 times. Relax and rest    1. Lie on your back with a rolled towel under your neck and a pillow under your knees. Extend your arms comfortably to your sides. 2. Relax and breathe normally. 3. Remain in this position for about 10 minutes. 4. If you can, do this 2 or 3 times each day. Follow-up care is a key part of your treatment and safety. Be sure to make and go to all appointments, and call your doctor if you are having problems. It's also a good idea to know your test results and keep a list of the medicines you take. Where can you learn more?   Go to http://chuy-jean.info/. Enter H846 in the search box to learn more about \"Back Stretches: Exercises. \"  Current as of: September 20, 2018  Content Version: 11.9  © 2867-7661 [employer]. Care instructions adapted under license by [employer] (which disclaims liability or warranty for this information). If you have questions about a medical condition or this instruction, always ask your healthcare professional. [address] any warranty or liability for your use of this information. Low Back Pain: Exercises  Your Care Instructions  Here are some examples of typical rehabilitation exercises for your condition. Start each exercise slowly. Ease off the exercise if you start to have pain. Your doctor or physical therapist will tell you when you can start these exercises and which ones will work best for you. How to do the exercises  Press-up    1. Lie on your stomach, supporting your body with your forearms. 2. Press your elbows down into the floor to raise your upper back. As you do this, relax your stomach muscles and allow your back to arch without using your back muscles. As your press up, do not let your hips or pelvis come off the floor. 3. Hold for 15 to 30 seconds, then relax. 4. Repeat 2 to 4 times. Alternate arm and leg (bird dog) exercise    1. Start on the floor, on your hands and knees. 2. Tighten your belly muscles. 3. Raise one leg off the floor, and hold it straight out behind you. Be careful not to let your hip drop down, because that will twist your trunk. 4. Hold for about 6 seconds, then lower your leg and switch to the other leg. 5. Repeat 8 to 12 times on each leg. 6. Over time, work up to holding for 10 to 30 seconds each time. 7. If you feel stable and secure with your leg raised, try raising the opposite arm straight out in front of you at the same time. Knee-to-chest exercise    1.  Lie on your back with your knees bent and your feet flat on the floor. 2. Bring one knee to your chest, keeping the other foot flat on the floor (or keeping the other leg straight, whichever feels better on your lower back). 3. Keep your lower back pressed to the floor. Hold for at least 15 to 30 seconds. 4. Relax, and lower the knee to the starting position. 5. Repeat with the other leg. Repeat 2 to 4 times with each leg. 6. To get more stretch, put your other leg flat on the floor while pulling your knee to your chest.    Curl-ups    1. Lie on the floor on your back with your knees bent at a 90-degree angle. Your feet should be flat on the floor, about 12 inches from your buttocks. 2. Cross your arms over your chest. If this bothers your neck, try putting your hands behind your neck (not your head), with your elbows spread apart. 3. Slowly tighten your belly muscles and raise your shoulder blades off the floor. 4. Keep your head in line with your body, and do not press your chin to your chest.  5. Hold this position for 1 or 2 seconds, then slowly lower yourself back down to the floor. 6. Repeat 8 to 12 times. Pelvic tilt exercise    1. Lie on your back with your knees bent. 2. \"Brace\" your stomach. This means to tighten your muscles by pulling in and imagining your belly button moving toward your spine. You should feel like your back is pressing to the floor and your hips and pelvis are rocking back. 3. Hold for about 6 seconds while you breathe smoothly. 4. Repeat 8 to 12 times. Heel dig bridging    1. Lie on your back with both knees bent and your ankles bent so that only your heels are digging into the floor. Your knees should be bent about 90 degrees. 2. Then push your heels into the floor, squeeze your buttocks, and lift your hips off the floor until your shoulders, hips, and knees are all in a straight line.   3. Hold for about 6 seconds as you continue to breathe normally, and then slowly lower your hips back down to the floor and rest for up to 10 seconds. 4. Do 8 to 12 repetitions. Hamstring stretch in doorway    1. Lie on your back in a doorway, with one leg through the open door. 2. Slide your leg up the wall to straighten your knee. You should feel a gentle stretch down the back of your leg. 3. Hold the stretch for at least 15 to 30 seconds. Do not arch your back, point your toes, or bend either knee. Keep one heel touching the floor and the other heel touching the wall. 4. Repeat with your other leg. 5. Do 2 to 4 times for each leg. Hip flexor stretch    1. Kneel on the floor with one knee bent and one leg behind you. Place your forward knee over your foot. Keep your other knee touching the floor. 2. Slowly push your hips forward until you feel a stretch in the upper thigh of your rear leg. 3. Hold the stretch for at least 15 to 30 seconds. Repeat with your other leg. 4. Do 2 to 4 times on each side. Wall sit    1. Stand with your back 10 to 12 inches away from a wall. 2. Lean into the wall until your back is flat against it. 3. Slowly slide down until your knees are slightly bent, pressing your lower back into the wall. 4. Hold for about 6 seconds, then slide back up the wall. 5. Repeat 8 to 12 times. Follow-up care is a key part of your treatment and safety. Be sure to make and go to all appointments, and call your doctor if you are having problems. It's also a good idea to know your test results and keep a list of the medicines you take. Where can you learn more? Go to http://chuy-jean.info/. Enter N854 in the search box to learn more about \"Low Back Pain: Exercises. \"  Current as of: September 20, 2018  Content Version: 11.9  © 0688-6462 [employer]. Care instructions adapted under license by [employer] (which disclaims liability or warranty for this information).  If you have questions about a medical condition or this instruction, always ask your healthcare professional. [place] [address] any warranty or liability for your use of this information. Therapeutic Ball: Back Exercises  Your Care Instructions  Here are some examples of typical exercises for your condition. Start each exercise slowly. Ease off the exercise if you start to have pain. Your doctor or physical therapist will tell you when you can start these exercises and which ones will work best for you. To prepare, make sure that your ball is the right size for you. When inflated and firm, it should allow you to sit with your hips and knees bent at about a 90-degree angle (like the letter L). How to do the exercises  Seated position on ball    1. Use this exercise to get used to moving on the ball and to find your best sitting position. 2. Sit comfortably on the ball with your feet about hip-width apart. If you feel unsteady, rest your hands on the ball near your hips. 3. As you do this exercise, try to keep your shoulders and upper body relaxed and still. 4. Using your stomach and back muscles to move your pelvis, roll the ball forward. This will round your back. 5. Still using your stomach and back muscles, roll the ball back. You will arch your back. 6. Repeat this rounding-arching motion a few times. 7. Stop in between the two positions, where your back is not rounded or arched. This is called your neutral position. Pelvic rotation    1. Sit tall on the ball. 2. Slowly rotate your hips in a [place] pattern. Keep the movement focused at your hips. 3. Repeat, but [place] in the other direction. 4. Repeat 8 to 12 times. Postural sitting    1. Use this position to find a stable, relaxed posture on the ball. You can use this position as your starting point for other ball exercises. If you feel unsteady on the ball, start on a chair first.  2. Sit on a ball or chair, with your feet planted straight in front of you.   3. Imagine that a string at the top of your head is pulling you straight up. Think of yourself as 2 inches taller than you are.  4. Slightly tuck your chin. 5. Keep your shoulders back and relaxed. Knee extension    1. Sit tall on the ball with your feet planted in front of you, hip-width apart. As you do this exercise, avoid slumping your shoulders and arching your back. 2. Rest your hands on the ball near your hip or a steady object next to you. (If you feel very stable on the ball, rest your hands in your lap or at your side.)  3. Slowly straighten one leg at the knee. Slowly lower it back down. Repeat with the other leg. 4. Repeat this exercise 8 to 12 times. Roll-ups    1. Lie on your back with your knees bent, feet resting on the floor. 2. Lay the ball on your thighs. Rest your hands up high on the ball. 3. Raising your head and shoulder blades, roll the ball up your thighs. Exhale as you roll up. 4. If this is hard on your neck, gently support your lower head and upper neck with one hand. Don't use that hand to pull your head up. 5. Repeat 8 to 12 times. Ball curls    1. Lie on your back with your ankles resting on the ball, knees straight. 2. Use your legs to roll the exercise ball toward you. Allow your knees to bend and move closer to your chest.  3. Pause briefly, and then roll the ball to the starting position. Try to keep the ball rolling straight. You will feel the muscles in your lower belly working. 4. Repeat 8 to 12 times. Bridge with ball under legs    1. Lie on your back with your legs up, calves resting on the ball. For more challenge, rest your heels on the ball. 2. Look up at the ceiling, and keep your chin relaxed. You can place a small pillow under your head or neck for comfort. 3. With your arms by your side, press your hands onto the floor for stability. 4. Tighten your belly muscles by pulling in your belly button toward your spine.   5. Push your heels down toward the floor, squeeze your buttocks, and lift your hips off the floor until your shoulders, hips, and knees are all in a straight line. 6. Try to keep the ball steady. Hold for about 6 seconds as you continue to breathe normally. 7. Slowly lower your hips back down to the floor. 8. Repeat 8 to 12 times. Ball curls with bridge    1. Start flat on your back with your ankles resting on the ball. 2. Look up at the ceiling, and keep your chin relaxed. You can place a small pillow under your head or neck for comfort. 3. With your arms by your side, press your hands onto the floor for stability. 4. Tighten your belly muscles by pulling in your belly button toward your spine. 5. Push your heels down toward the floor, squeeze your buttocks, and lift your hips off the floor until your shoulders, hips, and knees are all in a straight line. 6. While holding the bridge position, roll the ball toward you with your heels. Keep your hips as level as you can. 7. Pause briefly, and then roll the ball back out. Try to keep the ball rolling straight. You will feel the muscles in your lower belly working as you straighten your legs. 8. Lower your hips, and return to your starting position. 9. Repeat 8 to 12 times. 10. When you can keep your body and the ball steady throughout this exercise, you're ready for more challenge. Try keeping your hips raised while rolling the ball out, holding the bridge, and rolling back, a few times in a row. Praying saida    1. Kneel upright with the ball in front of you. 2. To start, clasp your hands together. Rest them on the ball in front of you. 3. As you do this exercise, keep your back and hips straight and tighten your belly and buttocks muscles. Keep your knees in place. 4. Press on the ball with your arms. Lean forward from the knees. This rolls the ball forward. You will bear most of your weight on your arms. 5. If your back starts to ache, you've gone too far. Pull back a bit.   6. Roll back to the start position. 7. Repeat 8 to 12 times. Walk-out plank on ball    1. Kneel over the ball. Place your hands on the floor in front of you. 2. Walk your hands forward until your legs are straight on the ball. This is the plank position. 3. When in plank position, hold your body straight and tighten your belly and buttocks muscles. Keep your chin slightly tucked. 4. Roll as far forward as you can without losing your balance or letting your hips drop. You may stop with the ball under your thighs, or even under your knees or shins. 5. Hold a few seconds, then walk your hands back and return to the start position. 6. Repeat 8 to 12 times. Push-up with thighs on ball    1. Kneel over the ball. Place your hands on the floor in front of you. 2. Walk your hands forward until your legs are straight on the ball. This is the plank position. 3. When in plank position, hold your body straight and tighten your belly and buttocks muscles. Keep your chin slightly tucked. 4. Roll as far forward as you can without losing your balance or letting your hips drop. You may stop with the ball under your thighs, or even under your knees or shins. 5. Bend your elbows. Slowly lower your body toward the ground as far as you can without losing your balance. 6. If your wrists hurt, try moving your hands a little farther apart so they're not right under your shoulders. 7. Slowly straighten your arms. 8. Do 8 to 12 of these push-ups. Wall squat with ball    1. Stand facing away from a wall. Place your feet about shoulder-width apart. 2. Place the ball between your middle back and the wall. Move your feet out in front of you so they are about a foot in front of your hips. 3. Keep your arms at your sides, or put your hands on your hips. 4. Slowly squat down as if you are going to sit in a chair, rolling your back over the ball as you squat. The ball should move with you but stay pressed into the wall.   5. Be sure that your knees do not go in front of your toes as you squat. 6. Hold for 6 seconds. 7. Slowly rise to your standing position. 8. Repeat 8 to 12 times. Child's pose with ball    1. Kneeling upright with your back straight, rest your hands on the ball in front of you. 2. Breathe out as you bend at the hips, and roll the ball forward. Lower your chest toward the ground, and drop your hips back toward your heels. 3. To stretch your upper back and shoulders, hold this position for 15 to 30 seconds. 4. Repeat 2 to 4 times. Follow-up care is a key part of your treatment and safety. Be sure to make and go to all appointments, and call your doctor if you are having problems. It's also a good idea to know your test results and keep a list of the medicines you take. Where can you learn more? Go to http://chuy-jean.info/. Enter R151 in the search box to learn more about \"Therapeutic Ball: Back Exercises. \"  Current as of: September 20, 2018  Content Version: 11.9  © 6258-1342 [employer]. Care instructions adapted under license by [employer] (which disclaims liability or warranty for this information). If you have questions about a medical condition or this instruction, always ask your healthcare professional. [address] any warranty or liability for your use of this information.

## 2019-05-06 NOTE — PROGRESS NOTES
[address]  [address], [address]  [place], [facility]  Phone: [phone]  Fax: [phone]        Kirsten Thompson  : 1980  PCP: Marcus Delarosa MD  2019    PROGRESS NOTE      HISTORY OF PRESENT ILLNESS  Ellen Godinez is a 41 y.o. male. He was seen in 2017 with c/o aching lumbar pain radiating into the lateral aspect of his LLE to the bottom of his foot x 1 month. He found relief from Prednisone 3x 60 mg tablets once with good relief and it resolved his LLE pain. He found improvement with PT with Neto therapy and work conditioning as well as Diclofenac 75mg BID. He was given a work restriction on medium duty with a 30 lb weight restriction. He returned in 2018 for an exacerbation of his low back pain radiating into his LLE in an S1 distribution. He found improvement with PT, Gabapentin 300mg TID, and  Prednisone dose pack, but continued to have episodic paraesthesia in the posterior aspect of his LLE. Lumbar MRI 18: L5-S1 disc protrusion with slight mass effect on the crossing left S1 nerve. Mild spinal stenosis as well as some foraminal stenosis at this level. No overt L5/S1 nerve impingement, but potential for such. Ellen Godinez comes in to the office today for 6 month f/u. He notes that he has not had any pain lately, but has had some stiffness after cleaning his car or other activities involving bending forward. He has been maintaining an HEP with a  with weightlifting, stretching, and cardio. He rates his pain as a 0/10 today. ASSESSMENT  His pain from the annular tear and neuritis appear to have resolved, however, he has some residual myofacial pain. PLAN  1. I advised he change positions when performing activities to prevent stiffness. Pt will f/u PRN. Diagnoses and all orders for this visit:    1. Annular tear of lumbar disc    2. Neuritis    3. Mechanical low back pain    4.  Myofascial pain        PAST MEDICAL HISTORY   No past medical history on file. No past surgical history on file. Jordyn Gearing MEDICATIONS    Current Outpatient Medications   Medication Sig Dispense Refill    diclofenac EC (VOLTAREN) 75 mg EC tablet Take 1 Tab by mouth two (2) times a day. 60 Tab 5    gabapentin (NEURONTIN) 300 mg capsule Take 2 Caps by mouth three (3) times daily. 180 Cap 2    gabapentin (NEURONTIN) 300 mg capsule Take 1 Cap by mouth three (3) times daily. 90 Cap 2    methocarbamol (ROBAXIN) 500 mg tablet Take 1 Tab by mouth three (3) times daily. 12 Tab 0    methocarbamol (ROBAXIN) 500 mg tablet Take 1 Tab by mouth three (3) times daily. 20 Tab 0    losartan-hydroCHLOROthiazide (HYZAAR) 100-25 mg per tablet Take 1 Tab by mouth daily.  famotidine (PEPCID) 40 mg tablet Take 40 mg by mouth daily.  ranitidine (ZANTAC) 150 mg tablet Take 150 mg by mouth two (2) times a day. ALLERGIES  No Known Allergies       SOCIAL HISTORY    Social History     Socioeconomic History    Marital status: UNKNOWN     Spouse name: Not on file    Number of children: Not on file    Years of education: Not on file    Highest education level: Not on file   Tobacco Use    Smoking status: Current Some Day Smoker     Packs/day: 0.50    Smokeless tobacco: Never Used   Substance and Sexual Activity    Alcohol use: Yes    Drug use: No       FAMILY HISTORY  No family history on file. REVIEW OF SYSTEMS  Review of Systems   Musculoskeletal: Positive for back pain. LLE paraesthesia           PHYSICAL EXAMINATION  There were no vitals taken for this visit. Pain Assessment  9/18/2018   Location of Pain Back   Location Modifiers -   Severity of Pain 4   Quality of Pain Aching   Quality of Pain Comment -   Duration of Pain -   Frequency of Pain Intermittent   Aggravating Factors -   Limiting Behavior -   Relieving Factors -           Constitutional:  Well developed, well nourished, in no acute distress. Psychiatric: Affect and mood are appropriate. Integumentary: No rashes or abrasions noted on exposed areas. SPINE/MUSCULOSKELETAL EXAM    Cervical spine:  Neck is midline. Normal muscle tone. No focal atrophy is noted. ROM pain free. Shoulder ROM intact. Mild tenderness to palpation. Negative Spurling's sign. Negative Tinel's sign. Negative Kirby's sign.       Sensation in the bilateral arms grossly intact to light touch.       Lumbar spine:  No rash, ecchymosis, or gross obliquity. No fasciculations. No focal atrophy is noted. No pain with hip ROM. Full range of motion. Tenderness to palpation L>R. No tenderness to palpation at the sciatic notch. SI joints non-tender. Trochanters non tender. Pain with back extension > flexion.      Sensation in the bilateral legs grossly intact to light touch.     Updates from 8/7/18:  Positive SLR on the L  Otherwise, neurologically intact     MOTOR:      Biceps  Triceps Deltoids Wrist Ext Wrist Flex Hand Intrin   Right 5/5 5/5 5/5 5/5 5/5 5/5   Left 5/5 5/5 5/5 5/5 5/5 5/5             Hip Flex  Quads Hamstrings Ankle DF EHL Ankle PF   Right 5/5 5/5 5/5 5/5 5/5 5/5   Left 5/5 5/5 5/5 5/5 5/5 5/5     DTRs are 2+ biceps, triceps, brachioradialis, patella, and Achilles.      Negative Straight Leg raise. Squat not tested. No difficulty with tandem gait.       Ambulation without assistive device. FWB.       RADIOGRAPHS  Lumbar MRI images taken on 9/26/18 personally reviewed with patient:  FINDINGS:      Alignment: Mild degenerative L5 retrolisthesis  Vertebral body height: Normal  Marrow signal: Unremarkable  Disc spaces:  Moderate narrowing and desiccation of L5-S1  Conus: Terminates at T12-L1     Axial imaging correlation:           L1-2: Patent canal and foramina.     L2-3: Patent canal and foramina.     L3-4: Patent canal and foramina.     L4-5: Patent canal and foramina.     L5-S1: Broad-based disc protrusion with apex slightly to left of midline. Sizable annular tear as a component. There is slight mass effect on the crossing  left S1 nerve, without overt impingement. Mild facet arthropathy. Minor spinal  stenosis. Mild to moderate left and mild right foraminal stenosis.     Other structures: Unremarkable.        IMPRESSION  IMPRESSION:     1. L5-S1 disc protrusion with slight mass effect on the crossing left S1 nerve. -Mild spinal stenosis as well as some foraminal stenosis at this level. No overt  L5/S1 nerve impingement, but potential for such.     Lumbar XR images taken on 5/12/2017 personally reviewed with patient:  FINDINGS:      The study shows mild dextroscoliosis in thoracolumbar spine.      Lumbar vertebral bodies are of normal heights and in normal alignment is. There  is no evidence of fracture or compression in lumbar spine.      The study shows minimal degenerative disc disease at L3/L4 and L2/L3 levels. At  L4/L5 and L5-S1 levels there are mild facet osteoarthritic changes.      IMPRESSION  IMPRESSION:      No evidence of fracture, vertebral body compression or malalignment in lumbar  spine or in visualized sacrum.      Minimal degenerative disc disease in lumbar spine.      Mild facet osteoarthritic changes in lower lumbar spine and lumbosacral  junction. 10 minutes of face-to-face contact were spent with the patient during today's visit extensively discussing symptoms and treatment plan. All questions were answered. More than half of this visit today was spent on counseling.      Written by Pennie Hopper as dictated by Joycelyn Thompson MD

## 2019-05-07 ENCOUNTER — HOSPITAL ENCOUNTER (OUTPATIENT)
Dept: GENERAL RADIOLOGY | Age: 39
Discharge: HOME | End: 2019-05-07
Payer: COMMERCIAL

## 2019-05-07 DIAGNOSIS — M25.542 PAIN, JOINT, HAND, LEFT: ICD-10-CM

## 2019-05-07 PROCEDURE — 73130 X-RAY EXAM OF HAND: CPT

## 2019-05-15 ENCOUNTER — OFFICE VISIT (OUTPATIENT)
Dept: NEUROLOGY | Age: 39
End: 2019-05-15

## 2019-05-15 DIAGNOSIS — R20.9 DISTURBANCE OF SKIN SENSATION: Primary | ICD-10-CM

## 2019-05-15 NOTE — PROGRESS NOTES
Ellen Godinez is a 41 y.o., right handed male, with a history of mild hypertension who comes in complaining of pain and swelling in the left hand that has been going on for no less than the last 2 months. He says that he has had some tenderness in the hand for 2 years but over the last 2 months the swelling has increased and he has now cannot make a fist or hold anything in his hand without pain and discomfort. He says that there is been swelling in the hand consistently over the past 2 months. He denies any tingling or burning. Social History; patient is single lives alone. Drinks alcohol on the weekends. Has a rare cigarette. No illicit drugs. 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 Current Outpatient Medications   Medication Sig Dispense Refill    diclofenac EC (VOLTAREN) 75 mg EC tablet Take 1 Tab by mouth two (2) times a day. 60 Tab 5    gabapentin (NEURONTIN) 300 mg capsule Take 2 Caps by mouth three (3) times daily. 180 Cap 2    gabapentin (NEURONTIN) 300 mg capsule Take 1 Cap by mouth three (3) times daily. 90 Cap 2    methocarbamol (ROBAXIN) 500 mg tablet Take 1 Tab by mouth three (3) times daily. 12 Tab 0    methocarbamol (ROBAXIN) 500 mg tablet Take 1 Tab by mouth three (3) times daily. 20 Tab 0    losartan-hydroCHLOROthiazide (HYZAAR) 100-25 mg per tablet Take 1 Tab by mouth daily.  famotidine (PEPCID) 40 mg tablet Take 40 mg by mouth daily.  ranitidine (ZANTAC) 150 mg tablet Take 150 mg by mouth two (2) times a day. No past medical history on file. No past surgical history on file.     No Known Allergies    Patient Active Problem List   Diagnosis Code    Severe obesity ([facility] [address] E66.01         Review of Systems:   As above otherwise 11 point review of systems negative including;   Constitutional no fever or chills  Skin denies rash or itching  HENT  Denies tinnitus, hearing lose  Eyes denies diplopia vision lose  Respiratory denies shortness of breath  Cardiovascular denies chest pain, dyspnea on exertion  Gastrointestinal denies nausea, vomiting, diarrhea, constipation  Genitourinary denies incontinence  Musculoskeletal denies joint pain or swelling  Endocrine denies weight change  Hematology denies easy bruising or bleeding   Neurological as above in HPI      PHYSICAL EXAMINATION:      VITAL SIGNS:  There were no vitals taken for this visit. GENERAL: The patient is well developed, well nourished, and in no apparent distress. EXTREMITIES: No clubbing, cyanosis, or edema is identified. Pulses 2+ and symmetrical.  Muscle tone is normal.  HEAD:   Ear, nose, and throat appear to be without trauma. The patient is normocephalic. NEUROLOGIC EXAMINATION    MENTAL STATUS: The patient is awake, alert, and oriented x 4. Fund of knowledge is adequate. Speech is fluent and memory appears to be intact, both long and short term. CRANIAL NERVES: II - Visual fields are full to confrontation. Funduscopic examination reveals flat disks bilaterally. Pupils are both 4 mm and briskly reactive to light and accommodation. III, IV, VI - Extraocular movements are intact and there is no nystagmus. V - Facial sensation is intact to pinprick and light touch. VII - Face is symmetrical.   VIII - Hearing is present. IX, X, XII- Palate rises symmetrically. Gag is present. Tongue is in the midline. XI - Shoulder shrugging and head turning intact  MOTOR:  The patient is 5/5 in all four limbs without any drift. Fine finger movements are symmetrical.  Isolated motor group testing reveals no focal abnormalities. Tone is normal.  Sensory examination is intact to pinprick, light touch and position sense testing. Reflexes are 2+ and symmetrical. Plantars are down going. Cerebellar examination reveals no gross ataxia or dysmetria.  Gait is normal.          CBC:   Lab Results   Component Value Date/Time    WBC 13.1 06/16/2016 09:35 PM    RBC 5.18 06/16/2016 09:35 PM    HGB 13.2 06/16/2016 09:35 PM HCT 39.2 06/16/2016 09:35 PM    PLATELET 668 71/10/4552 09:35 PM     BMP:   Lab Results   Component Value Date/Time    Glucose 90 06/16/2016 09:35 PM    Sodium 139 06/16/2016 09:35 PM    Potassium 3.5 06/16/2016 09:35 PM    Chloride 108 06/16/2016 09:35 PM    CO2 27 06/16/2016 09:35 PM    BUN 14 06/16/2016 09:35 PM    Creatinine 1.01 06/16/2016 09:35 PM    Calcium 8.9 06/16/2016 09:35 PM     CMP:   Lab Results   Component Value Date/Time    Glucose 90 06/16/2016 09:35 PM    Sodium 139 06/16/2016 09:35 PM    Potassium 3.5 06/16/2016 09:35 PM    Chloride 108 06/16/2016 09:35 PM    CO2 27 06/16/2016 09:35 PM    BUN 14 06/16/2016 09:35 PM    Creatinine 1.01 06/16/2016 09:35 PM    Calcium 8.9 06/16/2016 09:35 PM    Anion gap 4 06/16/2016 09:35 PM    BUN/Creatinine ratio 14 06/16/2016 09:35 PM    Alk.  phosphatase 56 06/16/2016 09:35 PM    Protein, total 7.6 06/16/2016 09:35 PM    Albumin 4.3 06/16/2016 09:35 PM    Globulin 3.3 06/16/2016 09:35 PM    A-G Ratio 1.3 06/16/2016 09:35 PM     Coagulation: No results found for: PTP, INR, APTT, PTTT  Cardiac markers: No results found for: CPK, CKND1, KRISTAN     [address] [facility]  OFFICE PROCEDURE PROGRESS NOTE        Chart reviewed for the following:   Denise Amanda MD, have reviewed the History, Physical and updated the Allergic reactions for [address] performed immediately prior to start of procedure:   Denise Amanda MD, have performed the following reviews on Sammie Kyle prior to the start of the procedure:            * Patient was identified by name and date of birth   * Agreement on procedure being performed was verified  * Risks and Benefits explained to the patient  * Procedure site verified and marked as necessary  * Patient was positioned for comfort  * Consent was signed and verified     Time: 10:45 AM    Date of procedure: 5/15/2019    Procedure performed by:  Javier Valdes MD    Provider assisted by: Ton Zachary Barrios MA    Patient assisted by: self    How tolerated by patient: tolerated the procedure well with no complications    Post Procedural Pain Scale: 0 - No Hurt    Comments: none    Patient: Teresa Blancas     ID: [id number] Physician: Maninder Woods MD   Gender: Male Ref Phys: Madhav Moore NP   Handedness: Satira Setting     Study Date: May 15, 2019       Nerve Conduction Studies  Anti Sensory Summary Table     Stim Site NR Peak (ms) Norm Peak (ms) O-P Amp (µV) Norm O-P Amp Dist (cm) Henry (m/s)   Left Median 2nd Digit Anti Sensory (2nd Digit)   Wrist    3.3 <3.5 32.4 >20 13.0 50.0   Site 2    3.3  34.6      Right Median 2nd Digit Anti Sensory (2nd Digit)   Wrist    3.4 <3.5 32.7 >20 13.0 50.0   Site 2    3.4  39.1      Left Ulnar Anti Sensory (5th Digit )   Wrist    3.1 <3.1 22.8 >17.0 11.0 45.8   Site 2    3.1  20.9      Right Ulnar Anti Sensory (5th Digit )   Wrist    3.0 <3.1 23.2 >17.0 11.0 57.9   Site 2    3.0  27.4        Motor Summary Table     Stim Site NR Onset (ms) Norm Onset (ms) O-P Amp (mV) Norm O-P Amp Dist (cm) Henry (m/s) Norm Henry (m/s)   Left Median Motor (Abd Poll Brev)   Wrist    3.3 <4.4 12.6 >4.0 25.0 54.3 >49   Elbow    7.9  14.6       Right Median Motor (Abd Poll Brev)   Wrist    3.3 <4.4 5.2 >4.0 27.0 60.0 >49   Elbow    7.8  6.0       Left Ulnar Motor (Abd Dig Minimi )   Wrist    3.0 <3.3 9.9 >6.0 22.0 56.4 >49   B Elbow    6.9  10.7  12.0 63.2 >50   A Elbow    8.8  10.9       Right Ulnar Motor (Abd Dig Minimi )   Wrist    3.2 <3.3 10.4 >6.0 21.0 53.8 >49   B Elbow    7.1  11.1  13.0 59.1 >50   A Elbow    9.3  10.9           EMG     Side Muscle Nerve Root Ins Act Fibs Psw Fasc Amp Dur Poly Recrt Int Rosalind Prader Comment   Left Deltoid Axillary C5-6 Nml Nml Nml None Nml Nml 0 Nml Nml    Left Biceps Musculocut C5-6 Nml Nml Nml None Nml Nml 0 Nml Nml    Left Triceps Radial C6-7-8 Nml Nml Nml None Nml Nml 0 Nml Nml    Left FlexCarRad Median C6-7 Nml Nml Nml None Nml Nml 0 Nml Nml    Left 1stDorInt Ulnar C8-T1 Nml Nml Nml None Nml Nml 0 Nml Nml    Left Abd Poll Brev Median C8-T1 Nml Nml Nml None Nml Nml 0 Nml Nml    Left Cervical Parasp Up Rami C1-3 Nml Nml Nml          Left Cervical Parasp Mid Rami C4-6 Nml Nml Nml          Left Cervical Parasp Low Rami C7-8 Nml Nml Nml            NCS/EMG FINDINGS:     Evaluation of the Left median motor, the Right median motor, the Left ulnar motor, the Right ulnar motor, the Left Median 2nd Digit sensory, the Right Median 2nd Digit sensory, the Left ulnar sensory, and the Right ulnar sensory nerves were unremarkable. Waveforms:                      Impression: This was a normal nerve conduction and EMG study showing no signs of neuropathy myopathy or radiculopathy in the nerves and muscles tested the. PLEASE NOTE:   This document has been produced using voice recognition software. Unrecognized errors in transcription may be present.

## 2019-05-15 NOTE — LETTER
5/15/19 Patient: Marshal Gill YOB: 1980 Date of Visit: 5/15/2019 Vicky Barry NP 
[address] [place] [address] [facility] [address] VIA Facsimile: [phone] Dear Vicky Barry NP, Thank you for referring Mr. Marli Ramos to [facility] for evaluation. My notes for this consultation are attached. If you have questions, please do not hesitate to call me. I look forward to following your patient along with you.  
 
 
Sincerely, 
 
Estephanie Coreas MD

## 2019-08-27 ENCOUNTER — HOSPITAL ENCOUNTER (OUTPATIENT)
Age: 39
Discharge: HOME | End: 2019-08-27
Attending: INTERNAL MEDICINE
Payer: COMMERCIAL

## 2019-08-27 DIAGNOSIS — M79.642 LEFT HAND PAIN: ICD-10-CM

## 2019-08-27 PROCEDURE — 73218 MRI UPPER EXTREMITY W/O DYE: CPT

## 2020-02-06 ENCOUNTER — HOSPITAL ENCOUNTER (OUTPATIENT)
Age: 40
Discharge: HOME | End: 2020-02-06
Attending: ORTHOPAEDIC SURGERY
Payer: COMMERCIAL

## 2020-02-06 DIAGNOSIS — M48.07 LUMBOSACRAL STENOSIS: ICD-10-CM

## 2020-02-06 DIAGNOSIS — M51.26 HNP (HERNIATED NUCLEUS PULPOSUS), LUMBAR: ICD-10-CM

## 2020-02-06 DIAGNOSIS — M48.061 STENOSIS, SPINAL, LUMBAR: ICD-10-CM

## 2020-02-06 PROCEDURE — 72148 MRI LUMBAR SPINE W/O DYE: CPT

## 2020-03-17 ENCOUNTER — HOSPITAL ENCOUNTER (OUTPATIENT)
Dept: LAB | Age: 40
Discharge: HOME | End: 2020-03-17
Payer: COMMERCIAL

## 2020-03-17 LAB
ALBUMIN SERPL-MCNC: 4.2 G/DL (ref 3.4–5)
ALBUMIN/GLOB SERPL: 1.3 {RATIO} (ref 0.8–1.7)
ALP SERPL-CCNC: 58 U/L (ref 45–117)
ALT SERPL-CCNC: 36 U/L (ref 16–61)
ANION GAP SERPL CALC-SCNC: 3 MMOL/L (ref 3–18)
AST SERPL-CCNC: 19 U/L (ref 10–38)
BASOPHILS # BLD: 0 K/UL (ref 0–0.1)
BASOPHILS NFR BLD: 0 % (ref 0–2)
BILIRUB SERPL-MCNC: 0.4 MG/DL (ref 0.2–1)
BUN SERPL-MCNC: 17 MG/DL (ref 7–18)
BUN/CREAT SERPL: 16 (ref 12–20)
CALCIUM SERPL-MCNC: 9.2 MG/DL (ref 8.5–10.1)
CHLORIDE SERPL-SCNC: 107 MMOL/L (ref 100–111)
CHOLEST SERPL-MCNC: 184 MG/DL
CO2 SERPL-SCNC: 30 MMOL/L (ref 21–32)
CREAT SERPL-MCNC: 1.06 MG/DL (ref 0.6–1.3)
DIFFERENTIAL METHOD BLD: ABNORMAL
EOSINOPHIL # BLD: 0.1 K/UL (ref 0–0.4)
EOSINOPHIL NFR BLD: 1 % (ref 0–5)
ERYTHROCYTE [DISTWIDTH] IN BLOOD BY AUTOMATED COUNT: 15.9 % (ref 11.6–14.5)
GLOBULIN SER CALC-MCNC: 3.2 G/DL (ref 2–4)
GLUCOSE SERPL-MCNC: 87 MG/DL (ref 74–99)
HCT VFR BLD AUTO: 39.1 % (ref 36–48)
HDLC SERPL-MCNC: 88 MG/DL (ref 40–60)
HDLC SERPL: 2.1 {RATIO} (ref 0–5)
HGB BLD-MCNC: 12.9 G/DL (ref 13–16)
LDLC SERPL CALC-MCNC: 62.2 MG/DL (ref 0–100)
LIPID PROFILE,FLP: ABNORMAL
LYMPHOCYTES # BLD: 2.1 K/UL (ref 0.9–3.6)
LYMPHOCYTES NFR BLD: 25 % (ref 21–52)
MCH RBC QN AUTO: 25.6 PG (ref 24–34)
MCHC RBC AUTO-ENTMCNC: 33 G/DL (ref 31–37)
MCV RBC AUTO: 77.6 FL (ref 74–97)
MONOCYTES # BLD: 0.5 K/UL (ref 0.05–1.2)
MONOCYTES NFR BLD: 6 % (ref 3–10)
NEUTS SEG # BLD: 5.6 K/UL (ref 1.8–8)
NEUTS SEG NFR BLD: 68 % (ref 40–73)
PLATELET # BLD AUTO: 247 K/UL (ref 135–420)
PMV BLD AUTO: 12.1 FL (ref 9.2–11.8)
POTASSIUM SERPL-SCNC: 4.2 MMOL/L (ref 3.5–5.5)
PROT SERPL-MCNC: 7.4 G/DL (ref 6.4–8.2)
RBC # BLD AUTO: 5.04 M/UL (ref 4.7–5.5)
SODIUM SERPL-SCNC: 140 MMOL/L (ref 136–145)
TRIGL SERPL-MCNC: 169 MG/DL (ref ?–150)
VLDLC SERPL CALC-MCNC: 33.8 MG/DL
WBC # BLD AUTO: 8.3 K/UL (ref 4.6–13.2)

## 2020-03-17 PROCEDURE — 36415 COLL VENOUS BLD VENIPUNCTURE: CPT

## 2020-03-17 PROCEDURE — 80061 LIPID PANEL: CPT

## 2020-03-17 PROCEDURE — 85025 COMPLETE CBC W/AUTO DIFF WBC: CPT

## 2020-03-17 PROCEDURE — 80053 COMPREHEN METABOLIC PANEL: CPT

## 2020-03-24 ENCOUNTER — HOSPITAL ENCOUNTER (OUTPATIENT)
Dept: CT IMAGING | Age: 40
Discharge: HOME | End: 2020-03-24
Attending: FAMILY MEDICINE
Payer: COMMERCIAL

## 2020-03-24 DIAGNOSIS — R51.9 ACUTE NONINTRACTABLE HEADACHE, UNSPECIFIED HEADACHE TYPE: ICD-10-CM

## 2020-03-24 PROCEDURE — 70470 CT HEAD/BRAIN W/O & W/DYE: CPT

## 2020-03-24 PROCEDURE — 74011636320 HC RX REV CODE- 636/320

## 2020-03-24 RX ADMIN — IOPAMIDOL 80 ML: 612 INJECTION, SOLUTION INTRAVENOUS at 09:04

## 2021-06-23 ENCOUNTER — OFFICE VISIT (OUTPATIENT)
Dept: FAMILY MEDICINE CLINIC | Age: 41
End: 2021-06-23
Payer: MEDICAID

## 2021-06-23 VITALS
WEIGHT: 249 LBS | RESPIRATION RATE: 20 BRPM | OXYGEN SATURATION: 100 % | HEIGHT: 69 IN | BODY MASS INDEX: 36.88 KG/M2 | DIASTOLIC BLOOD PRESSURE: 100 MMHG | SYSTOLIC BLOOD PRESSURE: 150 MMHG | TEMPERATURE: 96 F | HEART RATE: 59 BPM

## 2021-06-23 DIAGNOSIS — R61 EXCESSIVE SWEATING: ICD-10-CM

## 2021-06-23 DIAGNOSIS — Z11.59 ENCOUNTER FOR HEPATITIS C SCREENING TEST FOR LOW RISK PATIENT: ICD-10-CM

## 2021-06-23 DIAGNOSIS — D22.9 NUMEROUS SKIN MOLES: ICD-10-CM

## 2021-06-23 DIAGNOSIS — Z76.89 ENCOUNTER TO ESTABLISH CARE: ICD-10-CM

## 2021-06-23 DIAGNOSIS — K21.9 GASTROESOPHAGEAL REFLUX DISEASE WITHOUT ESOPHAGITIS: ICD-10-CM

## 2021-06-23 DIAGNOSIS — I10 ESSENTIAL HYPERTENSION: Primary | ICD-10-CM

## 2021-06-23 DIAGNOSIS — R35.0 URINARY FREQUENCY: ICD-10-CM

## 2021-06-23 PROCEDURE — 99203 OFFICE O/P NEW LOW 30 MIN: CPT | Performed by: NURSE PRACTITIONER

## 2021-06-23 RX ORDER — LOSARTAN POTASSIUM AND HYDROCHLOROTHIAZIDE 25; 100 MG/1; MG/1
1 TABLET ORAL DAILY
Qty: 90 TABLET | Refills: 1 | Status: SHIPPED | OUTPATIENT
Start: 2021-06-23 | End: 2021-06-24 | Stop reason: SDUPTHER

## 2021-06-23 RX ORDER — OMEPRAZOLE 20 MG/1
20 CAPSULE, DELAYED RELEASE ORAL DAILY
Qty: 90 CAPSULE | Refills: 1 | Status: SHIPPED | OUTPATIENT
Start: 2021-06-23 | End: 2021-06-24 | Stop reason: SDUPTHER

## 2021-06-23 NOTE — PROGRESS NOTES
No flowsheet data found. 1. Have you been to the ER, urgent care clinic since your last visit? Hospitalized since your last visit? No    2. Have you seen or consulted any other health care providers outside of the [address] [place] [address] since your last visit? Include any pap smears or colon screening.  No

## 2021-06-24 LAB
ALBUMIN SERPL-MCNC: 4.7 G/DL (ref 3.5–5)
ALBUMIN/GLOB SERPL: 1.3 {RATIO} (ref 1.1–2.2)
ALP SERPL-CCNC: 60 U/L (ref 45–117)
ALT SERPL-CCNC: 54 U/L (ref 12–78)
ANION GAP SERPL CALC-SCNC: 7 MMOL/L (ref 5–15)
AST SERPL-CCNC: 27 U/L (ref 15–37)
BASOPHILS # BLD: 0.1 K/UL (ref 0–0.1)
BASOPHILS NFR BLD: 1 % (ref 0–1)
BILIRUB SERPL-MCNC: 0.4 MG/DL (ref 0.2–1)
BUN SERPL-MCNC: 17 MG/DL (ref 6–20)
BUN/CREAT SERPL: 16 (ref 12–20)
CALCIUM SERPL-MCNC: 9.6 MG/DL (ref 8.5–10.1)
CHLORIDE SERPL-SCNC: 106 MMOL/L (ref 97–108)
CHOLEST SERPL-MCNC: 198 MG/DL
CO2 SERPL-SCNC: 25 MMOL/L (ref 21–32)
COMMENT, HOLDF: NORMAL
CREAT SERPL-MCNC: 1.04 MG/DL (ref 0.7–1.3)
DIFFERENTIAL METHOD BLD: ABNORMAL
EOSINOPHIL # BLD: 0.1 K/UL (ref 0–0.4)
EOSINOPHIL NFR BLD: 2 % (ref 0–7)
ERYTHROCYTE [DISTWIDTH] IN BLOOD BY AUTOMATED COUNT: 16.4 % (ref 11.5–14.5)
EST. AVERAGE GLUCOSE BLD GHB EST-MCNC: 94 MG/DL
GLOBULIN SER CALC-MCNC: 3.5 G/DL (ref 2–4)
GLUCOSE SERPL-MCNC: 78 MG/DL (ref 65–100)
HBA1C MFR BLD: 4.9 % (ref 4–5.6)
HCT VFR BLD AUTO: 46.7 % (ref 36.6–50.3)
HCV AB SERPL QL IA: NONREACTIVE
HCV COMMENT,HCGAC: NORMAL
HDLC SERPL-MCNC: 78 MG/DL
HDLC SERPL: 2.5 {RATIO} (ref 0–5)
HGB BLD-MCNC: 14.1 G/DL (ref 12.1–17)
IMM GRANULOCYTES # BLD AUTO: 0 K/UL (ref 0–0.04)
IMM GRANULOCYTES NFR BLD AUTO: 0 % (ref 0–0.5)
LDLC SERPL CALC-MCNC: 106 MG/DL (ref 0–100)
LYMPHOCYTES # BLD: 2.2 K/UL (ref 0.8–3.5)
LYMPHOCYTES NFR BLD: 28 % (ref 12–49)
MAGNESIUM SERPL-MCNC: 2.3 MG/DL (ref 1.6–2.4)
MCH RBC QN AUTO: 24.8 PG (ref 26–34)
MCHC RBC AUTO-ENTMCNC: 30.2 G/DL (ref 30–36.5)
MCV RBC AUTO: 82.1 FL (ref 80–99)
MONOCYTES # BLD: 0.5 K/UL (ref 0–1)
MONOCYTES NFR BLD: 7 % (ref 5–13)
NEUTS SEG # BLD: 4.8 K/UL (ref 1.8–8)
NEUTS SEG NFR BLD: 62 % (ref 32–75)
NRBC # BLD: 0 K/UL (ref 0–0.01)
NRBC BLD-RTO: 0 PER 100 WBC
PLATELET # BLD AUTO: 240 K/UL (ref 150–400)
PMV BLD AUTO: 12.9 FL (ref 8.9–12.9)
POTASSIUM SERPL-SCNC: 4.5 MMOL/L (ref 3.5–5.1)
PROT SERPL-MCNC: 8.2 G/DL (ref 6.4–8.2)
RBC # BLD AUTO: 5.69 M/UL (ref 4.1–5.7)
SAMPLES BEING HELD,HOLD: NORMAL
SODIUM SERPL-SCNC: 138 MMOL/L (ref 136–145)
TRIGL SERPL-MCNC: 70 MG/DL (ref ?–150)
TSH SERPL DL<=0.05 MIU/L-ACNC: 1.58 UIU/ML (ref 0.36–3.74)
VLDLC SERPL CALC-MCNC: 14 MG/DL
WBC # BLD AUTO: 7.7 K/UL (ref 4.1–11.1)

## 2021-06-24 RX ORDER — LOSARTAN POTASSIUM AND HYDROCHLOROTHIAZIDE 25; 100 MG/1; MG/1
1 TABLET ORAL DAILY
Qty: 90 TABLET | Refills: 1 | Status: SHIPPED | OUTPATIENT
Start: 2021-06-24 | End: 2022-01-12

## 2021-06-24 RX ORDER — OMEPRAZOLE 20 MG/1
20 CAPSULE, DELAYED RELEASE ORAL DAILY
Qty: 90 CAPSULE | Refills: 1 | Status: SHIPPED | OUTPATIENT
Start: 2021-06-24 | End: 2022-01-12

## 2021-06-24 NOTE — TELEPHONE ENCOUNTER
----- Message from Logan Mitchell sent at 6/24/2021 12:58 PM EDT -----  Regarding: Dr. Tan Hoff (if not patient):Ms. Florentino Will      Relationship of caller (if not patient): Mother       Best contact number(s):[phone]      Name of medication and dosage if known:losartan-hydroCHLOROthiazide (HYZAAR) 100-25 mg per tablet   omeprazole (PRILOSEC) 20 mg capsule [    Is patient out of this medication (yes/no):yes      Pharmacy name: [address] [place] [address] listed in chart? (yes/no): No  Pharmacy phone number:N/A      Details to clarify the request: The patient mother called and said that the prescriptions were sent to the [address] [facility] in error need to go to [facility] in [place] on [address] [facility], [address]

## 2021-06-28 NOTE — PROGRESS NOTES
Subjective:     Kael Alex is a 47 y.o. male who presents today with the following:  Chief Complaint   Patient presents with    New Patient    Blood Pressure Check    Sweats       Patient Active Problem List   Diagnosis Code    Severe obesity ([facility] E66.01         COMPLIANT WITH MEDICATION:   HTN; Denies chest pain, dyspnea, palpitations, headache and blurred vision. Blood pressure elevated today. He has been off BP medications for 6 weeks. Here to establish care. Current everyday smoker. Excessive sweating;  Several weeks no trigger, Intermittently throughout the day will begin to sweat irregardless of temperature. Moles; numerus moles on body . Mole on neck (left side) rubs ,mole on truck rubs. GERD; had been taking rantidine before it was taken off the market. Heartburn persistent requests an alternative. depression screening addressed not at risk    abuse screening addressed denies    learning assessment addressed reviewed nurses notes    fall risk addressed not at risk    HM: addressed would like to discuss at next visit. ROS:  Gen: denies fever, chills, fatigue, weight loss, weight gain  HEENT:denies blurry vision, nasal congestion, sore throat  Resp: denies dypsnea, cough, wheezing  CV: denies chest pain radiating to the jaws or arms, palpitations, lower extremity edema  Abd: denies nausea, vomiting, diarrhea, constipation  Neuro: denies numbness/tingling  Endo: denies polyuria, polydipsia,Endorses  Heat intolerance  Heme: no lymphadenopathy    No Known Allergies      Current Outpatient Medications:     omeprazole (PRILOSEC) 20 mg capsule, Take 1 Capsule by mouth daily. , Disp: 90 Capsule, Rfl: 1    losartan-hydroCHLOROthiazide (HYZAAR) 100-25 mg per tablet, Take 1 Tablet by mouth daily. , Disp: 90 Tablet, Rfl: 1    History reviewed. No pertinent past medical history. History reviewed. No pertinent surgical history.     Social History     Tobacco Use   Smoking Status Current Some Day Smoker    Packs/day: 0.50   Smokeless Tobacco Never Used       Social History     Socioeconomic History    Marital status: SINGLE     Spouse name: Not on file    Number of children: Not on file    Years of education: Not on file    Highest education level: Not on file   Tobacco Use    Smoking status: Current Some Day Smoker     Packs/day: 0.50    Smokeless tobacco: Never Used   Substance and Sexual Activity    Alcohol use: Yes    Drug use: No     Social Determinants of Health     Financial Resource Strain:     Difficulty of Paying Living Expenses:    Food Insecurity:     Worried About Running Out of Food in the Last Year:     [address] in the Last Year:    Transportation Needs:     Lack of Transportation (Medical):  Lack of Transportation (Non-Medical):    Physical Activity:     Days of Exercise per Week:     Minutes of Exercise per Session:    Stress:     Feeling of Stress :    Social Connections:     Frequency of Communication with Friends and Family:     Frequency of Social Gatherings with Friends and Family:     Attends Church Services:     Active Member of Clubs or Organizations:     Attends Club or Organization Meetings:     Marital Status:        History reviewed. No pertinent family history. Objective:     Visit Vitals  BP (!) 150/100 (BP 1 Location: Left upper arm, BP Patient Position: At rest, BP Cuff Size: Large adult)   Pulse (!) 59   Temp (!) 96 °F (35.6 °C) (Core)   Resp 20   Ht 5' 9\" (1.753 m)   Wt 249 lb (112.9 kg)   SpO2 100%   BMI 36.77 kg/m²     Body mass index is 36.77 kg/m². General: Alert and oriented. No acute distress. Well nourished  HEENT :  Ears:TMs are normal. Canals are clear. Eyes: pupils equal, round, react to light and accommodation. Extra ocular movements intact. Nose: patent. Mouth and throat is clear. Neck:supple full range of motion no thyromegaly. Trachea midline, No carotid bruits.  No significant lymphadenopathy  Lungs[de-identified] clear to auscultation without wheezes, rales, or rhonchi. Heart :RRR, S1 & S2 are normal intensity. No murmur; no gallop  Abdomen: bowel sounds active. No tenderness, guarding, rebound, masses, hepatic or spleen enlargement  Back: no CVA tenderness. Extremities: without clubbing, cyanosis, or edema  Pulses: radial and femoral pulses are normal  Neuro: HMF intact. Cranial nerves II through XII grossly normal.  Motor: is 5 over 5 and symmetrical.   Deep tendon reflexes: +2 equal  Integumentary; small mole on left side of neck and left side of trunk . Psych:appropriate behavior, mood, affect and judgement. Results for orders placed or performed in visit on 06/23/21   HEPATITIS C AB   Result Value Ref Range    Hep C virus Ab Interp. NONREACTIVE NONREACTIVE      Hep C  virus Ab comment Method used is Siemens Advia I Move Youaur     MAGNESIUM   Result Value Ref Range    Magnesium 2.3 1.6 - 2.4 mg/dL   METABOLIC PANEL, COMPREHENSIVE   Result Value Ref Range    Sodium 138 136 - 145 mmol/L    Potassium 4.5 3.5 - 5.1 mmol/L    Chloride 106 97 - 108 mmol/L    CO2 25 21 - 32 mmol/L    Anion gap 7 5 - 15 mmol/L    Glucose 78 65 - 100 mg/dL    BUN 17 6 - 20 MG/DL    Creatinine 1.04 0.70 - 1.30 MG/DL    BUN/Creatinine ratio 16 12 - 20      GFR est AA >60 >60 ml/min/1.73m2    GFR est non-AA >60 >60 ml/min/1.73m2    Calcium 9.6 8.5 - 10.1 MG/DL    Bilirubin, total 0.4 0.2 - 1.0 MG/DL    ALT (SGPT) 54 12 - 78 U/L    AST (SGOT) 27 15 - 37 U/L    Alk.  phosphatase 60 45 - 117 U/L    Protein, total 8.2 6.4 - 8.2 g/dL    Albumin 4.7 3.5 - 5.0 g/dL    Globulin 3.5 2.0 - 4.0 g/dL    A-G Ratio 1.3 1.1 - 2.2     LIPID PANEL   Result Value Ref Range    Cholesterol, total 198 <200 MG/DL    Triglyceride 70 <150 MG/DL    HDL Cholesterol 78 MG/DL    LDL, calculated 106 (H) 0 - 100 MG/DL    VLDL, calculated 14 MG/DL    CHOL/HDL Ratio 2.5 0.0 - 5.0     CBC WITH AUTOMATED DIFF   Result Value Ref Range    WBC 7.7 4.1 - 11.1 K/uL    RBC 5.69 4.10 - 5.70 M/uL HGB 14.1 12.1 - 17.0 g/dL    HCT 46.7 36.6 - 50.3 %    MCV 82.1 80.0 - 99.0 FL    MCH 24.8 (L) 26.0 - 34.0 PG    MCHC 30.2 30.0 - 36.5 g/dL    RDW 16.4 (H) 11.5 - 14.5 %    PLATELET 539 348 - 114 K/uL    MPV 12.9 8.9 - 12.9 FL    NRBC 0.0 0  WBC    ABSOLUTE NRBC 0.00 0.00 - 0.01 K/uL    NEUTROPHILS 62 32 - 75 %    LYMPHOCYTES 28 12 - 49 %    MONOCYTES 7 5 - 13 %    EOSINOPHILS 2 0 - 7 %    BASOPHILS 1 0 - 1 %    IMMATURE GRANULOCYTES 0 0.0 - 0.5 %    ABS. NEUTROPHILS 4.8 1.8 - 8.0 K/UL    ABS. LYMPHOCYTES 2.2 0.8 - 3.5 K/UL    ABS. MONOCYTES 0.5 0.0 - 1.0 K/UL    ABS. EOSINOPHILS 0.1 0.0 - 0.4 K/UL    ABS. BASOPHILS 0.1 0.0 - 0.1 K/UL    ABS. IMM. GRANS. 0.0 0.00 - 0.04 K/UL    DF AUTOMATED     TESTOSTERONE, FREE & TOTAL   Result Value Ref Range    Testosterone 423 264 - 916 ng/dL    Comment <<DO NOT REPORT>>     TSH 3RD GENERATION   Result Value Ref Range    TSH 1.58 0.36 - 3.74 uIU/mL   HEMOGLOBIN A1C WITH EAG   Result Value Ref Range    Hemoglobin A1c 4.9 4.0 - 5.6 %    Est. average glucose 94 mg/dL   SAMPLES BEING HELD   Result Value Ref Range    SAMPLES BEING HELD 1SST     COMMENT        Add-on orders for these samples will be processed based on acceptable specimen integrity and analyte stability, which may vary by analyte. Results for orders placed or performed in visit on 06/23/21   HEPATITIS C AB   Result Value Ref Range    Hep C virus Ab Interp.  NONREACTIVE NONREACTIVE      Hep C  virus Ab comment Method used is Siemens Advia Centaur     MAGNESIUM   Result Value Ref Range    Magnesium 2.3 1.6 - 2.4 mg/dL   METABOLIC PANEL, COMPREHENSIVE   Result Value Ref Range    Sodium 138 136 - 145 mmol/L    Potassium 4.5 3.5 - 5.1 mmol/L    Chloride 106 97 - 108 mmol/L    CO2 25 21 - 32 mmol/L    Anion gap 7 5 - 15 mmol/L    Glucose 78 65 - 100 mg/dL    BUN 17 6 - 20 MG/DL    Creatinine 1.04 0.70 - 1.30 MG/DL    BUN/Creatinine ratio 16 12 - 20      GFR est AA >60 >60 ml/min/1.73m2    GFR est non-AA >60 >60 ml/min/1.73m2    Calcium 9.6 8.5 - 10.1 MG/DL    Bilirubin, total 0.4 0.2 - 1.0 MG/DL    ALT (SGPT) 54 12 - 78 U/L    AST (SGOT) 27 15 - 37 U/L    Alk. phosphatase 60 45 - 117 U/L    Protein, total 8.2 6.4 - 8.2 g/dL    Albumin 4.7 3.5 - 5.0 g/dL    Globulin 3.5 2.0 - 4.0 g/dL    A-G Ratio 1.3 1.1 - 2.2     LIPID PANEL   Result Value Ref Range    Cholesterol, total 198 <200 MG/DL    Triglyceride 70 <150 MG/DL    HDL Cholesterol 78 MG/DL    LDL, calculated 106 (H) 0 - 100 MG/DL    VLDL, calculated 14 MG/DL    CHOL/HDL Ratio 2.5 0.0 - 5.0     CBC WITH AUTOMATED DIFF   Result Value Ref Range    WBC 7.7 4.1 - 11.1 K/uL    RBC 5.69 4.10 - 5.70 M/uL    HGB 14.1 12.1 - 17.0 g/dL    HCT 46.7 36.6 - 50.3 %    MCV 82.1 80.0 - 99.0 FL    MCH 24.8 (L) 26.0 - 34.0 PG    MCHC 30.2 30.0 - 36.5 g/dL    RDW 16.4 (H) 11.5 - 14.5 %    PLATELET 099 807 - 770 K/uL    MPV 12.9 8.9 - 12.9 FL    NRBC 0.0 0  WBC    ABSOLUTE NRBC 0.00 0.00 - 0.01 K/uL    NEUTROPHILS 62 32 - 75 %    LYMPHOCYTES 28 12 - 49 %    MONOCYTES 7 5 - 13 %    EOSINOPHILS 2 0 - 7 %    BASOPHILS 1 0 - 1 %    IMMATURE GRANULOCYTES 0 0.0 - 0.5 %    ABS. NEUTROPHILS 4.8 1.8 - 8.0 K/UL    ABS. LYMPHOCYTES 2.2 0.8 - 3.5 K/UL    ABS. MONOCYTES 0.5 0.0 - 1.0 K/UL    ABS. EOSINOPHILS 0.1 0.0 - 0.4 K/UL    ABS. BASOPHILS 0.1 0.0 - 0.1 K/UL    ABS. IMM. GRANS. 0.0 0.00 - 0.04 K/UL    DF AUTOMATED     TESTOSTERONE, FREE & TOTAL   Result Value Ref Range    Testosterone 423 264 - 916 ng/dL    Comment <<DO NOT REPORT>>     TSH 3RD GENERATION   Result Value Ref Range    TSH 1.58 0.36 - 3.74 uIU/mL   HEMOGLOBIN A1C WITH EAG   Result Value Ref Range    Hemoglobin A1c 4.9 4.0 - 5.6 %    Est. average glucose 94 mg/dL   SAMPLES BEING HELD   Result Value Ref Range    SAMPLES BEING HELD 1SST     COMMENT        Add-on orders for these samples will be processed based on acceptable specimen integrity and analyte stability, which may vary by analyte. Assessment/ Plan:     1. Essential hypertension  BP elevated restart losartan -HCTZ 100-25mg po q day. Check BP at home (if arm cuff available) and bring log to next visit. - CBC WITH AUTOMATED DIFF; Future  - LIPID PANEL; Future  - METABOLIC PANEL, COMPREHENSIVE; Future  - COLLECTION VENOUS BLOOD,VENIPUNCTURE; Future  - MAGNESIUM; Future  - MAGNESIUM  - COLLECTION VENOUS BLOOD,VENIPUNCTURE  - METABOLIC PANEL, COMPREHENSIVE  - LIPID PANEL  - CBC WITH AUTOMATED DIFF    2. Excessive sweating    - TESTOSTERONE, FREE & TOTAL; Future  - TESTOSTERONE, FREE & TOTAL  - TSH 3RD GENERATION; Future  - TSH 3RD GENERATION    3. Multiple skin moles  Patient education provided regarding skin cancer and how to perform skin checks. 4. BMI 36.0-36.9,adult  Discussed the patient's BMI with him. The BMI follow up plan is as follows:     dietary management education, guidance, and counseling  encourage exercise  monitor weight  prescribed dietary intake    An After Visit Summary was printed and given to the patient. 5. Encounter for hepatitis C screening test for low risk patient  - HEPATITIS C AB; Future  - HEPATITIS C AB    6. Urinary frequency  Screen  For diabetes and cystitis. Consider prostatitis if condition persits or worsens   - HEMOGLOBIN A1C WITH EAG;  Future  - AMB POC URINALYSIS DIP STICK MANUAL W/O MICRO  - HEMOGLOBIN A1C WITH EAG      Orders Placed This Encounter    COLLECTION VENOUS BLOOD,VENIPUNCTURE     Standing Status:   Future     Number of Occurrences:   1     Standing Expiration Date:   7/23/2021    TESTOSTERONE, FREE & TOTAL     Standing Status:   Future     Number of Occurrences:   1     Standing Expiration Date:   6/23/2022    CBC WITH AUTOMATED DIFF     Standing Status:   Future     Number of Occurrences:   1     Standing Expiration Date:   7/23/2021    LIPID PANEL     Standing Status:   Future     Number of Occurrences:   1     Standing Expiration Date:   7/15/2052    METABOLIC PANEL, COMPREHENSIVE     Standing Status:   Future     Number of Occurrences:   1     Standing Expiration Date:   7/23/2021    MAGNESIUM     Standing Status:   Future     Number of Occurrences:   1     Standing Expiration Date:   6/23/2022    HEPATITIS C AB     Standing Status:   Future     Number of Occurrences:   1     Standing Expiration Date:   7/23/2021    HEMOGLOBIN A1C WITH EAG     Standing Status:   Future     Number of Occurrences:   1     Standing Expiration Date:   7/23/2021    TSH 3RD GENERATION     Standing Status:   Future     Number of Occurrences:   1     Standing Expiration Date:   6/23/2022    SAMPLES BEING HELD     ATLASSITEID:[id number]    AMB POC URINALYSIS DIP STICK MANUAL W/O MICRO    DISCONTD: losartan-hydroCHLOROthiazide (HYZAAR) 100-25 mg per tablet     Sig: Take 1 Tablet by mouth daily. Dispense:  90 Tablet     Refill:  1    DISCONTD: omeprazole (PRILOSEC) 20 mg capsule     Sig: Take 1 Capsule by mouth daily. Dispense:  90 Capsule     Refill:  1         Verbal and written instructions (see AVS) provided. Patient expresses understanding of diagnosis and treatment plan.     Health Maintenance Due   Topic Date Due    Pneumococcal 0-64 years (1 of 2 - PPSV23) Never done    COVID-19 Vaccine (1) Never done    DTaP/Tdap/Td series (1 - Tdap) Never done               NUSRAT Silva

## 2021-06-29 LAB
COMMENT, TESC2: NORMAL
TESTOST FREE SERPL-MCNC: 11.1 PG/ML (ref 8.7–25.1)
TESTOST SERPL-MCNC: 423 NG/DL (ref 264–916)

## 2021-06-29 NOTE — PROGRESS NOTES
Patient verified stating name and date of birth. Michell Reich informed of lab results and states understanding.

## 2021-07-14 ENCOUNTER — OFFICE VISIT (OUTPATIENT)
Dept: FAMILY MEDICINE CLINIC | Age: 41
End: 2021-07-14
Payer: MEDICAID

## 2021-07-14 VITALS
RESPIRATION RATE: 18 BRPM | HEIGHT: 69 IN | TEMPERATURE: 97 F | HEART RATE: 57 BPM | WEIGHT: 245 LBS | BODY MASS INDEX: 36.29 KG/M2 | OXYGEN SATURATION: 99 % | SYSTOLIC BLOOD PRESSURE: 140 MMHG | DIASTOLIC BLOOD PRESSURE: 98 MMHG

## 2021-07-14 DIAGNOSIS — M54.41 CHRONIC MIDLINE LOW BACK PAIN WITH RIGHT-SIDED SCIATICA: Primary | ICD-10-CM

## 2021-07-14 DIAGNOSIS — G89.29 CHRONIC MIDLINE LOW BACK PAIN WITH RIGHT-SIDED SCIATICA: Primary | ICD-10-CM

## 2021-07-14 PROCEDURE — 99213 OFFICE O/P EST LOW 20 MIN: CPT | Performed by: NURSE PRACTITIONER

## 2021-07-15 NOTE — PROGRESS NOTES
Subjective:     Em Salvador is a 47 y.o. male who complains of low back pain intermittent x 3 years, positional with bending or lifting, without radiation down the legs. Most recent  Episode  X 7 days Precipitating factors: recent heavy lifting. Prior history of back problems: recurrent self limited episodes of low back pain in the past. Has seen a spine specialist in the [facility] prior to moving back to the [address] [place] [address]. Marely Rodriguez He has been told he has a protruding disk seen on MRI per patient. There is no numbness in the legs. Symptoms are worst: as the day goes on . Marely Rodriguez Alleviating factors identifiable by patient are standing, sitting, recumbency. Exacerbating factors identifiable by patient are bending forwards, bending backwards. Rigth side sciatica. Patient Active Problem List   Diagnosis Code    Severe obesity ([facility] [address] E66.01     Patient Active Problem List    Diagnosis Date Noted    Severe obesity ([facility] [address] 05/06/2019     Current Outpatient Medications   Medication Sig Dispense Refill    omeprazole (PRILOSEC) 20 mg capsule Take 1 Capsule by mouth daily. 90 Capsule 1    losartan-hydroCHLOROthiazide (HYZAAR) 100-25 mg per tablet Take 1 Tablet by mouth daily. 90 Tablet 1     No Known Allergies  Past Medical History:   Diagnosis Date    HTN (hypertension)      History reviewed. No pertinent surgical history. History reviewed. No pertinent family history. Social History     Tobacco Use    Smoking status: Current Some Day Smoker     Packs/day: 0.50    Smokeless tobacco: Never Used   Substance Use Topics    Alcohol use: Yes        Review of Systems  Pertinent items are noted in HPI.     Objective:     Visit Vitals  BP (!) 140/98 (BP 1 Location: Left upper arm, BP Patient Position: Sitting, BP Cuff Size: Large adult)   Pulse (!) 57   Temp 97 °F (36.1 °C) (Temporal)   Resp 18   Ht 5' 9\" (1.753 m)   Wt 245 lb (111.1 kg)   SpO2 99%   BMI 36.18 kg/m²      Patient appears to be in mild to moderate pain, antalgic gait noted. Lumbosacral spine area reveals no local tenderness or mass. Painful and reduced LS ROM noted. Straight leg raise is positive at 45 degrees on bilateral.Tenderness with abduction on the right  Less on the left. DTR's, motor strength and sensation normal, including heel and toe gait. Peripheral pulses are palpable. MRI 1/2020 L5-S1: Central disc protrusion with annular tear, superimposed upon a mild  broad-based disc osteophyte complex. There is some facet arthropathy. Minor  impression of ventral thecal sac. No significant spinal stenosis overall. Mild  foraminal stenosis, without exiting nerve impingement. The disc protrusion has  slightly regressed/improved in the interval    Assessment/Plan:     1. Chronic midline low back pain with right-sided sciatica    - REFERRAL TO ORTHOPEDICS    For acute pain, rest, intermittent application of heat (do not sleep on heating pad), analgesics and muscle relaxants are recommended. Discussed longer term treatment plan of prn NSAID's and discussed a home back care exercise program with flexion exercise routine. Proper lifting with avoidance of heavy lifting discussed. Consider Physical Therapy and XRay studies if not improving. Call or return to clinic prn if these symptoms worsen or fail to improve as anticipated. ICD-10-CM ICD-9-CM    1. Chronic midline low back pain with right-sided sciatica  M54.41 724.2 REFERRAL TO ORTHOPEDICS    G89.29 724.3      338.29      Encounter Diagnoses   Name Primary?  Chronic midline low back pain with right-sided sciatica Yes     Diagnoses and all orders for this visit:    1.  Chronic midline low back pain with right-sided sciatica  -     REFERRAL TO ORTHOPEDICS

## 2021-11-08 ENCOUNTER — TELEPHONE (OUTPATIENT)
Dept: FAMILY MEDICINE CLINIC | Age: 41
End: 2021-11-08

## 2021-11-08 NOTE — TELEPHONE ENCOUNTER
Patient is looking for recommendation for something for his cough. Has tried several things. Wants to speak with nurse.

## 2021-11-09 RX ORDER — GUAIFENESIN 100 MG/5ML
200 SOLUTION ORAL
Qty: 118 ML | Refills: 1 | Status: SHIPPED | OUTPATIENT
Start: 2021-11-09 | End: 2022-02-25 | Stop reason: ALTCHOICE

## 2022-01-12 RX ORDER — OMEPRAZOLE 20 MG/1
20 CAPSULE, DELAYED RELEASE ORAL DAILY
Qty: 90 CAPSULE | Refills: 1 | Status: SHIPPED | OUTPATIENT
Start: 2022-01-12 | End: 2022-07-06 | Stop reason: SDUPTHER

## 2022-01-12 RX ORDER — LOSARTAN POTASSIUM AND HYDROCHLOROTHIAZIDE 25; 100 MG/1; MG/1
1 TABLET ORAL DAILY
Qty: 90 TABLET | Refills: 1 | Status: SHIPPED | OUTPATIENT
Start: 2022-01-12 | End: 2022-06-01

## 2022-02-17 ENCOUNTER — OFFICE VISIT (OUTPATIENT)
Dept: FAMILY MEDICINE CLINIC | Age: 42
End: 2022-02-17
Payer: MEDICAID

## 2022-02-17 VITALS
DIASTOLIC BLOOD PRESSURE: 70 MMHG | TEMPERATURE: 97.8 F | HEART RATE: 82 BPM | OXYGEN SATURATION: 99 % | RESPIRATION RATE: 20 BRPM | BODY MASS INDEX: 36.91 KG/M2 | SYSTOLIC BLOOD PRESSURE: 138 MMHG | WEIGHT: 249.2 LBS | HEIGHT: 69 IN

## 2022-02-17 DIAGNOSIS — M54.41 CHRONIC BILATERAL LOW BACK PAIN WITH BILATERAL SCIATICA: Primary | ICD-10-CM

## 2022-02-17 DIAGNOSIS — G89.29 CHRONIC BILATERAL LOW BACK PAIN WITH BILATERAL SCIATICA: Primary | ICD-10-CM

## 2022-02-17 DIAGNOSIS — M54.42 CHRONIC BILATERAL LOW BACK PAIN WITH BILATERAL SCIATICA: Primary | ICD-10-CM

## 2022-02-17 PROCEDURE — 99214 OFFICE O/P EST MOD 30 MIN: CPT | Performed by: NURSE PRACTITIONER

## 2022-02-17 RX ORDER — PREDNISONE 10 MG/1
TABLET ORAL
Qty: 12 TABLET | Refills: 0 | Status: SHIPPED | OUTPATIENT
Start: 2022-02-17 | End: 2022-02-25 | Stop reason: ALTCHOICE

## 2022-02-17 NOTE — PROGRESS NOTES
Chief Complaint   Patient presents with    LOW BACK PAIN       HPI:     is a 46 y.o. male in today with a CC of chronic lower back pain x several years. No known injury. He saw a spinal specialist, went to PT, had imaging including xrays and MRI. He reports a bulging disc at L5. Pain improved but restarted a few months ago. He reports lower back pain that radiates down both legs. Pain is worse when seated or laying. No bowel or bladder symptoms. No Known Allergies    Current Outpatient Medications   Medication Sig    predniSONE (DELTASONE) 10 mg tablet 2 tabs PO once daily x 4 days and then 1 tab PO once daily x 4 days    losartan-hydroCHLOROthiazide (HYZAAR) 100-25 mg per tablet TAKE 1 TABLET BY MOUTH DAILY    omeprazole (PRILOSEC) 20 mg capsule TAKE 1 CAPSULE BY MOUTH DAILY    guaiFENesin (Tussin) 100 mg/5 mL liquid Take 10 mL by mouth three (3) times daily as needed for Cough or Congestion. (Patient not taking: Reported on 2/17/2022)     No current facility-administered medications for this visit. Past Medical History:   Diagnosis Date    HTN (hypertension)        History reviewed. No pertinent family history. ROS:  Denies fever, chills, cough, chest pain, SOB,  nausea, vomiting, diarrhea, dysuria. Denies rashes, wounds, + arthralgias, weakness, numbness, visual changes, depression. Denies wt loss, wt gain, hemoptysis, hematochezia or melena. Patient is not experiencing chest pain radiating to the jaw and/or down the arms.     Physical Examination:    /70 (BP 1 Location: Right arm, BP Patient Position: Sitting, BP Cuff Size: Adult)   Pulse 82   Temp 97.8 °F (36.6 °C) (Temporal)   Resp 20   Ht 5' 9\" (1.753 m)   Wt 249 lb 3.2 oz (113 kg)   SpO2 99%   BMI 36.80 kg/m²     Wt Readings from Last 3 Encounters:   02/17/22 249 lb 3.2 oz (113 kg)   07/14/21 245 lb (111.1 kg)   06/23/21 249 lb (112.9 kg)       Physical Exam    Constitutional: WDWN Male in no acute distress  HENT:  NC/AT, TMs pearly gray, OP: clear  EYES: EOMI, PERRL  Neck:  Supple, no JVD, mass or bruit. No thyromegaly. Respiratory:  Respirations even and unlabored without use of accessory muscles, CTA throughout without wheezes, rales, rubs or rhonchi. Symmetrical chest expansion. Cardiac: RRR no clicks, murmurs, gallops, or rubs  Musculoskeletal:  No cyanosis, clubbing or edema of extremities. Moves all extremities without difficulty. Neurologic:  Smooth, even gait without assistance, CN 2-12 grossly intact. Skin: intact and warm to the touch, no rash   Lymphadenopathy: no cervical or supraclavicular nodes  Psych: Pleasant and appropriate. Judgment normal. Alert and oriented x 3. ASSESSMENT AND PLAN:       ICD-10-CM ICD-9-CM    1. Chronic bilateral low back pain with bilateral sciatica  M54.42 724.2 REFERRAL TO PHYSICAL THERAPY    M54.41 724. 3 predniSONE (DELTASONE) 10 mg tablet    G89.29 338.29 REFERRAL TO ORTHOPEDICS       Steroids, referral to PT and ortho as above. Recommended modified duty at work. If pain persists after steroids, consider low dose NSAID but will need to monitor BP. Medication Side Effects and Warnings were discussed with patient:  yes  Patient Labs were reviewed:  yes  Patient Past Records were reviewed:  yes    Patient aware of plan of care and verbalized understanding. Questions answered. RTC PRN.     Rylee Navarrete NP

## 2022-02-17 NOTE — PATIENT INSTRUCTIONS
Back Pain: Care Instructions  Your Care Instructions     Back pain has many possible causes. It is often related to problems with muscles and ligaments of the back. It may also be related to problems with the nerves, discs, or bones of the back. Moving, lifting, standing, sitting, or sleeping in an awkward way can strain the back. Sometimes you don't notice the injury until later. Arthritis is another common cause of back pain. Although it may hurt a lot, back pain usually improves on its own within several weeks. Most people recover in 12 weeks or less. Using good home treatment and being careful not to stress your back can help you feel better sooner. Follow-up care is a key part of your treatment and safety. Be sure to make and go to all appointments, and call your doctor if you are having problems. It's also a good idea to know your test results and keep a list of the medicines you take. How can you care for yourself at home? · Sit or lie in positions that are most comfortable and reduce your pain. Try one of these positions when you lie down:  ? Lie on your back with your knees bent and supported by large pillows. ? Lie on the floor with your legs on the seat of a sofa or chair. ? Lie on your side with your knees and hips bent and a pillow between your legs. ? Lie on your stomach if it does not make pain worse. · Do not sit up in bed, and avoid soft couches and twisted positions. Bed rest can help relieve pain at first, but it delays healing. Avoid bed rest after the first day of back pain. · Change positions every 30 minutes. If you must sit for long periods of time, take breaks from sitting. Get up and walk around, or lie in a comfortable position. · Try using a heating pad on a low or medium setting for 15 to 20 minutes every 2 or 3 hours. Try a warm shower in place of one session with the heating pad. · You can also try an ice pack for 10 to 15 minutes every 2 to 3 hours.  Put a thin cloth between the ice pack and your skin. · Take pain medicines exactly as directed. ? If the doctor gave you a prescription medicine for pain, take it as prescribed. ? If you are not taking a prescription pain medicine, ask your doctor if you can take an over-the-counter medicine. · Take short walks several times a day. You can start with 5 to 10 minutes, 3 or 4 times a day, and work up to longer walks. Walk on level surfaces and avoid hills and stairs until your back is better. · Return to work and other activities as soon as you can. Continued rest without activity is usually not good for your back. · To prevent future back pain, do exercises to stretch and strengthen your back and stomach. Learn how to use good posture, safe lifting techniques, and proper body mechanics. When should you call for help? Call your doctor now or seek immediate medical care if:    · You have new or worsening numbness in your legs.     · You have new or worsening weakness in your legs. (This could make it hard to stand up.)     · You lose control of your bladder or bowels. Watch closely for changes in your health, and be sure to contact your doctor if:    · You have a fever, lose weight, or don't feel well.     · You do not get better as expected. Where can you learn more? Go to http://www.oglesby.com/  Enter I594 in the search box to learn more about \"Back Pain: Care Instructions. \"  Current as of: July 1, 2021               Content Version: 13.0  © 8078-6920 [employer]. Care instructions adapted under license by [employer] (which disclaims liability or warranty for this information). If you have questions about a medical condition or this instruction, always ask your healthcare professional. [place] [address] any warranty or liability for your use of this information.

## 2022-02-17 NOTE — PROGRESS NOTES
1. Have you been to the ER, urgent care clinic since your last visit? Hospitalized since your last visit? No    2. Have you seen or consulted any other health care providers outside of the [address] [place] [address] since your last visit? Include any pap smears or colon screening.  No

## 2022-02-25 ENCOUNTER — OFFICE VISIT (OUTPATIENT)
Dept: FAMILY MEDICINE CLINIC | Age: 42
End: 2022-02-25
Payer: MEDICAID

## 2022-02-25 VITALS
HEART RATE: 78 BPM | BODY MASS INDEX: 38.3 KG/M2 | TEMPERATURE: 97.6 F | HEIGHT: 69 IN | WEIGHT: 258.6 LBS | OXYGEN SATURATION: 99 % | RESPIRATION RATE: 20 BRPM | SYSTOLIC BLOOD PRESSURE: 138 MMHG | DIASTOLIC BLOOD PRESSURE: 78 MMHG

## 2022-02-25 DIAGNOSIS — L91.8 SKIN TAG: Primary | ICD-10-CM

## 2022-02-25 PROCEDURE — 11200 RMVL SKIN TAGS UP TO&INC 15: CPT | Performed by: NURSE PRACTITIONER

## 2022-02-25 NOTE — PATIENT INSTRUCTIONS
Skin Tag Removal: Care Instructions  Your Care Instructions  Skin tags are small lumps of fleshy brown, tan, or pink skin. They are usually raised or hang from the skin on a small stalk. They often grow on the eyelids, neck, armpit, and groin. Skin tags are not moles and usually do not turn into cancer. You are more likely to get skin tags if you are overweight. They also tend to run in families. Skin tags may be removed if they bother you. Your doctor can remove an unwanted skin tag by simply cutting it off. However, new skin tags often form. Follow-up care is a key part of your treatment and safety. Be sure to make and go to all appointments, and call your doctor if you are having problems. It's also a good idea to know your test results and keep a list of the medicines you take. How can you care for yourself at home? · If clothing irritates a skin tag, cover it with a bandage to prevent rubbing and bleeding. · If you have a skin tag removed, clean the area with soap and water two times a day unless your doctor gives you different instructions. Don't use hydrogen peroxide or alcohol, which can slow healing. ? You may cover the wound with a thin layer of petroleum jelly, such as Vaseline, and a nonstick bandage. · Check all the skin on your body once a month for skin growths or other changes, such as color and feel of the skin. ?  front of a full-length mirror. Look carefully at the front and back of your body. Then look at your right and left sides with your arms raised. ? Bend your elbows and look carefully at your forearms, the back of your upper arms, and your palms. ? Look at your feet, the soles of your feet, and the spaces between your toes. ? Use a hand mirror to look at the back of your legs, the back of your neck, and your back, rear end (buttocks), and genital area. Part the hair on your head to look at your scalp. · If you see a change in a skin growth, contact your doctor. Look for:  ? A mole that bleeds. ? A fast-growing mole. ? A scaly or crusted growth on the skin. ? A sore that will not heal.  When should you call for help? Call your doctor now or seek immediate medical care if:    · You have signs of infection such as:  ? Pain, warmth, or swelling in your skin. ? Red streaks near a wound in your skin. ? Pus coming from a wound in your skin. ? A fever. Watch closely for changes in your health, and be sure to contact your doctor if:    · You have an area of normal skin that suddenly changes in shape, size, or how it looks.     · You do not get better as expected. Where can you learn more? Go to http://www.gray.com/  Enter B457 in the search box to learn more about \"Skin Tag Removal: Care Instructions. \"  Current as of: March 3, 2021               Content Version: 13.0  © 3834-8192 [employer]. Care instructions adapted under license by [employer] (which disclaims liability or warranty for this information). If you have questions about a medical condition or this instruction, always ask your healthcare professional. [place] [address] any warranty or liability for your use of this information.

## 2022-02-25 NOTE — PROGRESS NOTES
Chief Complaint   Patient presents with    Mole     L side of neck       HPI:     is a 46 y.o. male in today for an acute visit to have a skin tag removed from his left neck. It has been present x 1+ year, sl enlarging. Sometimes it irritates him. No Known Allergies    Current Outpatient Medications   Medication Sig    losartan-hydroCHLOROthiazide (HYZAAR) 100-25 mg per tablet TAKE 1 TABLET BY MOUTH DAILY    omeprazole (PRILOSEC) 20 mg capsule TAKE 1 CAPSULE BY MOUTH DAILY     No current facility-administered medications for this visit. Past Medical History:   Diagnosis Date    HTN (hypertension)        History reviewed. No pertinent family history. ROS:  Denies fever, chills, cough, chest pain, SOB,  nausea, vomiting, diarrhea, dysuria. Denies rashes, wounds, arthralgias, weakness, numbness, visual changes, depression. Denies wt loss, wt gain, hemoptysis, hematochezia or melena. Patient is not experiencing chest pain radiating to the jaw and/or down the arms. Physical Examination:    /78 (BP 1 Location: Right arm, BP Patient Position: Sitting, BP Cuff Size: Adult long)   Pulse 78   Temp 97.6 °F (36.4 °C) (Temporal)   Resp 20   Ht 5' 9\" (1.753 m)   Wt 258 lb 9.6 oz (117.3 kg)   SpO2 99%   BMI 38.19 kg/m²     Wt Readings from Last 3 Encounters:   02/25/22 258 lb 9.6 oz (117.3 kg)   02/17/22 249 lb 3.2 oz (113 kg)   07/14/21 245 lb (111.1 kg)     Constitutional: WDWN Male in no acute distress  HENT:  NC/AT  EYES: EOMI  Respiratory:  Respirations even and unlabored without use of accessory muscles  Neurologic:  Smooth, even gait without assistance, CN 2-12 grossly intact. Skin: intact and warm to the touch, 5 mm skin tag noted to L lateral neck  Lymphadenopathy: no cervical or supraclavicular nodes  Psych: Pleasant and appropriate. Judgment normal. Alert and oriented x 3.       Time out performed immediately prior to procedure:    Chart reviewed for the following:    *  Patient identified by name and   *  Agreement on procedure being performed  *  Risks and Benefits explained to the patient. *  Procedure site verified and marked as needed  *  Patient positioned for comfort  *  Consent was signed and verified    Time:  9:40 A  Date of Procedure:  22  Procedure performed by Ethelene Bamberger NP  Assistant:  Wendie Aguirre CMA  Patient tolerated procedure well  Post procedural pain scale:  0 - no hurt  Comments:  none      Skin tag was snipped off using Betadine for cleansing and sterile iris scissors. Local anesthesia was not used. This pathognomonic lesion was not sent for pathology. ASSESSMENT AND PLAN:       ICD-10-CM ICD-9-CM    1. Skin tag  L91.8 701.9          Patient aware of plan of care and verbalized understanding. Questions answered. RTC PRN.     Penelope Johnston NP

## 2022-03-20 PROBLEM — E66.01 SEVERE OBESITY (HCC): Status: ACTIVE | Noted: 2019-05-06

## 2022-06-01 RX ORDER — LOSARTAN POTASSIUM AND HYDROCHLOROTHIAZIDE 25; 100 MG/1; MG/1
1 TABLET ORAL DAILY
Qty: 90 TABLET | Refills: 1 | Status: SHIPPED | OUTPATIENT
Start: 2022-06-01

## 2022-07-06 ENCOUNTER — OFFICE VISIT (OUTPATIENT)
Dept: FAMILY MEDICINE CLINIC | Age: 42
End: 2022-07-06
Payer: MEDICAID

## 2022-07-06 VITALS
BODY MASS INDEX: 35.61 KG/M2 | HEIGHT: 68 IN | DIASTOLIC BLOOD PRESSURE: 70 MMHG | HEART RATE: 68 BPM | OXYGEN SATURATION: 97 % | RESPIRATION RATE: 18 BRPM | SYSTOLIC BLOOD PRESSURE: 110 MMHG | WEIGHT: 235 LBS | TEMPERATURE: 97 F

## 2022-07-06 DIAGNOSIS — I10 ESSENTIAL HYPERTENSION: ICD-10-CM

## 2022-07-06 DIAGNOSIS — K21.9 GASTROESOPHAGEAL REFLUX DISEASE WITHOUT ESOPHAGITIS: ICD-10-CM

## 2022-07-06 DIAGNOSIS — H65.91 OME (OTITIS MEDIA WITH EFFUSION), RIGHT: ICD-10-CM

## 2022-07-06 DIAGNOSIS — Z00.01 ENCOUNTER FOR GENERAL ADULT MEDICAL EXAMINATION WITH ABNORMAL FINDINGS: Primary | ICD-10-CM

## 2022-07-06 DIAGNOSIS — E66.01 SEVERE OBESITY (HCC): ICD-10-CM

## 2022-07-06 PROCEDURE — 99213 OFFICE O/P EST LOW 20 MIN: CPT

## 2022-07-06 PROCEDURE — 99395 PREV VISIT EST AGE 18-39: CPT

## 2022-07-06 RX ORDER — FLUTICASONE PROPIONATE 50 MCG
2 SPRAY, SUSPENSION (ML) NASAL DAILY
Qty: 1 EACH | Refills: 5 | Status: SHIPPED | OUTPATIENT
Start: 2022-07-06 | End: 2022-10-25 | Stop reason: ALTCHOICE

## 2022-07-06 RX ORDER — OMEPRAZOLE 40 MG/1
40 CAPSULE, DELAYED RELEASE ORAL DAILY
Qty: 90 CAPSULE | Refills: 1 | Status: SHIPPED | OUTPATIENT
Start: 2022-07-06

## 2022-07-06 NOTE — PATIENT INSTRUCTIONS
Gastroesophageal Reflux Disease (GERD): Care Instructions  Overview     Gastroesophageal reflux disease (GERD) is the backward flow of stomach acid into the esophagus. The esophagus is the tube that leads from your throat to your stomach. A one-way valve prevents the stomach acid from backing up into this tube. But when you have GERD, this valve does not close tightly enough. This can also cause pain and swelling in your esophagus. (This is called esophagitis.)  If you have mild GERD symptoms including heartburn, you may be able to control the problem with antacids or over-the-counter medicine. You can also make lifestyle changes to help reduce your symptoms. These include changing your diet and eating habits, such as not eating late at night and losing weight. Follow-up care is a key part of your treatment and safety. Be sure to make and go to all appointments, and call your doctor if you are having problems. It's also a good idea to know your test results and keep a list of the medicines you take. How can you care for yourself at home? · Take your medicines exactly as prescribed. Call your doctor if you think you are having a problem with your medicine. · Your doctor may recommend over-the-counter medicine. For mild or occasional indigestion, antacids, such as Tums, Gaviscon, Mylanta, or Maalox, may help. Your doctor also may recommend over-the-counter acid reducers, such as famotidine (Pepcid AC), cimetidine (Tagamet HB), or omeprazole (Prilosec). Read and follow all instructions on the label. If you use these medicines often, talk with your doctor. · Change your eating habits. ? It's best to eat several small meals instead of two or three large meals. ? After you eat, wait 2 to 3 hours before you lie down. ? Avoid foods that make your symptoms worse.  These may include chocolate, mint, alcohol, pepper, spicy foods, high-fat foods, or drinks with caffeine in them, such as tea, coffee, daniel, or energy drinks. If your symptoms are worse after you eat a certain food, you may want to stop eating it to see if your symptoms get better. · Do not smoke or chew tobacco. Smoking can make GERD worse. If you need help quitting, talk to your doctor about stop-smoking programs and medicines. These can increase your chances of quitting for good. · If you have GERD symptoms at night, raise the head of your bed 6 to 8 inches by putting the frame on blocks or placing a foam wedge under the head of your mattress. (Adding extra pillows does not work.)  · Do not wear tight clothing around your middle. · Lose weight if you need to. Losing just 5 to 10 pounds can help. When should you call for help? Call your doctor now or seek immediate medical care if:    · You have new or different belly pain.     · Your stools are black and tarlike or have streaks of blood. Watch closely for changes in your health, and be sure to contact your doctor if:    · Your symptoms have not improved after 2 days.     · Food seems to catch in your throat or chest.   Where can you learn more? Go to http://www.gray.com/  Enter G099 in the search box to learn more about \"Gastroesophageal Reflux Disease (GERD): Care Instructions. \"  Current as of: September 8, 2021               Content Version: 13.2  © 2006-2022 [employer]. Care instructions adapted under license by [employer] (which disclaims liability or warranty for this information). If you have questions about a medical condition or this instruction, always ask your healthcare professional. [place] [address] any warranty or liability for your use of this information. Eustachian Tube Problems: Care Instructions  Overview     The eustachian (say \"you-STAY-shee-un\") tubes run between the inside of the ears and the throat. They keep air pressure stable in the ears.  If your eustachian tubes become blocked, the air pressure in your ears changes. The fluids from a cold can clog eustachian tubes, causing pain in the ears. A quick change in air pressure can cause eustachian tubes to close up. This might happen when an airplane changes altitude or when a  goes up or down underwater. Eustachian tube problems often clear up on their own or after treating the cause of the blockage. If your tubes continue to be blocked, you may need surgery. Follow-up care is a key part of your treatment and safety. Be sure to make and go to all appointments, and call your doctor if you are having problems. It's also a good idea to know your test results and keep a list of the medicines you take. How can you care for yourself at home? · Try a simple exercise to help open blocked tubes. Close your mouth, hold your nose, and gently blow as if you are blowing your nose. Yawning and chewing gum also may help. You may hear or feel a \"pop\" when the tubes open. · To ease ear pain, apply a warm washcloth or a heating pad set on low. There may be some drainage from the ear when the heat melts earwax. Put a cloth between the heat source and your skin. · If your doctor prescribed antibiotics, take them as directed. Do not stop taking them just because you feel better. You need to take the full course of antibiotics. · Be safe with medicines. Depending on the cause of the problem, your doctor may recommend over-the-counter medicine. For example, adults may try decongestants for cold symptoms or nasal spray steroids for allergies. Follow the instructions carefully. · Be careful with cough and cold medicines. Don't give them to children younger than 6, because they don't work for children that age and can even be harmful. For children 6 and older, always follow all the instructions carefully. Make sure you know how much medicine to give and how long to use it. And use the dosing device if one is included. When should you call for help?    Call your doctor now or seek immediate medical care if:    · You develop sudden, complete hearing loss.     · You have severe pain or feel dizzy.     · You have new or increasing pus or blood draining from your ear.     · You have redness, swelling, or pain around or behind the ear. Watch closely for changes in your health, and be sure to contact your doctor if:    · You do not get better after 2 weeks.     · You have any new symptoms, such as itching or a feeling of fullness in the ear. Where can you learn more? Go to http://www.gray.com/  Enter Y822 in the search box to learn more about \"Eustachian Tube Problems: Care Instructions. \"  Current as of: September 8, 2021               Content Version: 13.2  © 2006-2022 [employer]. Care instructions adapted under license by [employer] (which disclaims liability or warranty for this information). If you have questions about a medical condition or this instruction, always ask your healthcare professional. [place] [address] any warranty or liability for your use of this information.

## 2022-07-06 NOTE — PROGRESS NOTES
1. \"Have you been to the ER, urgent care clinic since your last visit? Hospitalized since your last visit? \" No    2. \"Have you seen or consulted any other health care providers outside of the [address] [place] [address] since your last visit? \" No     3. For patients aged 39-70: Has the patient had a colonoscopy / FIT/ Cologuard? NA - based on age      If the patient is female:    4. For patients aged 41-77: Has the patient had a mammogram within the past 2 years? NA - based on age or sex      11. For patients aged 21-65: Has the patient had a pap smear?  NA - based on age or sex

## 2022-07-06 NOTE — PROGRESS NOTES
Chief Complaint   Patient presents with    Physical       HPI:     is a 46 y.o. male who presents for AWV. HTN:  Mostly compliant with losartan-HCTZ; home BPs 120-140s/80-90s. Denies CP, SOB, palpitations. GERD:  Symptoms of heartburn several days per week; has found that doubling his dose of omeprazole is more effective. Obesity:  He has started working out at [employer], also making some dietary changes resulting in 20+ pound weight loss over the last few months. New issues:  Left lower dental pain from impacted wisdom tooth; plans to have this extracted ASAP. Has noted occasional sore throat for the past few weeks; attributes to dental pain. Denies fever, chills, difficulty swallowing; endorses momentary dizziness that occurs when turning his head to the left. He has been taking occasional Allegra which seems to help. No Known Allergies    Current Outpatient Medications   Medication Sig    omeprazole (PRILOSEC) 40 mg capsule Take 1 Capsule by mouth daily.  fluticasone propionate (FLONASE) 50 mcg/actuation nasal spray 2 Sprays by Both Nostrils route daily.  losartan-hydroCHLOROthiazide (HYZAAR) 100-25 mg per tablet TAKE 1 TABLET BY MOUTH DAILY     No current facility-administered medications for this visit. Past Medical History:   Diagnosis Date    HTN (hypertension)        No family history on file. Review of Systems   Constitutional: Negative. Negative for chills, fever and malaise/fatigue. HENT: Positive for sore throat. Dental pain   Eyes: Negative. Respiratory: Negative. Negative for cough and shortness of breath. Cardiovascular: Negative. Negative for chest pain, palpitations and leg swelling. Gastrointestinal: Positive for heartburn. Negative for abdominal pain, blood in stool, constipation, diarrhea, melena, nausea and vomiting. Genitourinary: Negative. Musculoskeletal: Negative. Skin: Negative.     Neurological: Positive for dizziness. Negative for headaches. Endo/Heme/Allergies: Negative. Psychiatric/Behavioral: Negative. Negative for depression. The patient is not nervous/anxious. /70 (BP 1 Location: Right upper arm, BP Patient Position: Sitting, BP Cuff Size: Large adult)   Pulse 68   Temp 97 °F (36.1 °C) (Temporal)   Resp 18   Ht 5' 8\" (1.727 m)   Wt 235 lb (106.6 kg)   SpO2 97%   BMI 35.73 kg/m²     Wt Readings from Last 3 Encounters:   07/06/22 235 lb (106.6 kg)   02/25/22 258 lb 9.6 oz (117.3 kg)   02/17/22 249 lb 3.2 oz (113 kg)       3 most recent PHQ Screens 7/6/2022   Little interest or pleasure in doing things Not at all   Feeling down, depressed, irritable, or hopeless Not at all   Total Score PHQ 2 0   Trouble falling or staying asleep, or sleeping too much Not at all   Feeling tired or having little energy Not at all   Poor appetite, weight loss, or overeating Not at all   Feeling bad about yourself - or that you are a failure or have let yourself or your family down Not at all   Trouble concentrating on things such as school, work, reading, or watching TV Not at all   Moving or speaking so slowly that other people could have noticed; or the opposite being so fidgety that others notice Not at all   Thoughts of being better off dead, or hurting yourself in some way Not at all   PHQ 9 Score 0   How difficult have these problems made it for you to do your work, take care of your home and get along with others Not difficult at all       Physical Exam  Vitals and nursing note reviewed. Constitutional:       General: He is not in acute distress. Appearance: Normal appearance. HENT:      Head: Normocephalic and atraumatic.       Right Ear: Ear canal and external ear normal.      Left Ear: Tympanic membrane, ear canal and external ear normal.      Ears:      Comments: Presence of air-fluids levels right TM; no erythema or bulging noted     Nose: Nose normal.      Mouth/Throat:      Mouth: Mucous membranes are moist.      Pharynx: Oropharynx is clear. Comments: Left lower buccal mucosa mildly erythematous and edematous, no abscess noted  Eyes:      Extraocular Movements: Extraocular movements intact. Conjunctiva/sclera: Conjunctivae normal.      Pupils: Pupils are equal, round, and reactive to light. Neck:      Vascular: No carotid bruit. Cardiovascular:      Rate and Rhythm: Normal rate and regular rhythm. Pulses: Normal pulses. Heart sounds: Normal heart sounds. No murmur heard. No friction rub. No gallop. Pulmonary:      Effort: Pulmonary effort is normal.      Breath sounds: Normal breath sounds. No wheezing, rhonchi or rales. Abdominal:      General: Bowel sounds are normal. There is no distension. Palpations: Abdomen is soft. Tenderness: There is no abdominal tenderness. Musculoskeletal:         General: Normal range of motion. Cervical back: Normal range of motion and neck supple. Lymphadenopathy:      Cervical: No cervical adenopathy. Skin:     General: Skin is warm and dry. Neurological:      General: No focal deficit present. Mental Status: He is alert and oriented to person, place, and time. Cranial Nerves: Cranial nerves are intact. Motor: Motor function is intact. Coordination: Coordination is intact. Psychiatric:         Mood and Affect: Mood normal.         Behavior: Behavior normal.         Thought Content: Thought content normal.         Judgment: Judgment normal.       ASSESSMENT AND PLAN:       ICD-10-CM ICD-9-CM    1. Encounter for general adult medical examination with abnormal findings  Z00.01 V70.0    2. Essential hypertension  I10 401.9 COLLECTION VENOUS BLOOD,VENIPUNCTURE      CBC WITH AUTOMATED DIFF      METABOLIC PANEL, COMPREHENSIVE   3. Severe obesity (HCC)  E66.01 278.01 COLLECTION VENOUS BLOOD,VENIPUNCTURE      CBC WITH AUTOMATED DIFF      METABOLIC PANEL, COMPREHENSIVE   4.  Gastroesophageal reflux disease without esophagitis  K21.9 530.81 COLLECTION VENOUS BLOOD,VENIPUNCTURE      CBC WITH AUTOMATED DIFF      METABOLIC PANEL, COMPREHENSIVE      omeprazole (PRILOSEC) 40 mg capsule   5. OME (otitis media with effusion), right  H65.91 381.4 fluticasone propionate (FLONASE) 50 mcg/actuation nasal spray       He is to be congratulated on his weight loss efforts! Continue to work on dietary changes and daily physical activity; recommend he reduce alcohol intake. Discussed lifestyle modifications: elevate head of bed, avoid laying down for 2-3  hours after meal, avoid acidic foods including tomatoes, citrus, chocolate, peppermint, EtOH, carbonated drinks, coffee, juices especially at evening meal. Limit NSAID use. Increase omeprazole dose. Recommend daily use of Allegra; add daily Flonase. Medication Side Effects and Warnings were discussed with patient:  yes  Patient Labs were reviewed:  yes  Patient Past Records were reviewed:  yes      Patient aware of plan of care and verbalized understanding. Questions answered. RTC annually or sooner if needed. On this date 07/06/2022 I have spent 30 minutes reviewing previous notes, test results and face to face with the patient discussing the diagnosis and importance of compliance with the treatment plan as well as documenting on the day of the visit.     Rosa Harris NP

## 2022-07-07 LAB
ALBUMIN SERPL-MCNC: 4 G/DL (ref 3.5–5)
ALBUMIN/GLOB SERPL: 1.2 {RATIO} (ref 1.1–2.2)
ALP SERPL-CCNC: 61 U/L (ref 45–117)
ALT SERPL-CCNC: 136 U/L (ref 12–78)
ANION GAP SERPL CALC-SCNC: 8 MMOL/L (ref 5–15)
AST SERPL-CCNC: 54 U/L (ref 15–37)
BASOPHILS # BLD: 0.1 K/UL (ref 0–0.1)
BASOPHILS NFR BLD: 1 % (ref 0–1)
BILIRUB SERPL-MCNC: 0.3 MG/DL (ref 0.2–1)
BUN SERPL-MCNC: 15 MG/DL (ref 6–20)
BUN/CREAT SERPL: 17 (ref 12–20)
CALCIUM SERPL-MCNC: 9.6 MG/DL (ref 8.5–10.1)
CHLORIDE SERPL-SCNC: 104 MMOL/L (ref 97–108)
CO2 SERPL-SCNC: 26 MMOL/L (ref 21–32)
CREAT SERPL-MCNC: 0.9 MG/DL (ref 0.7–1.3)
DIFFERENTIAL METHOD BLD: ABNORMAL
EOSINOPHIL # BLD: 0.2 K/UL (ref 0–0.4)
EOSINOPHIL NFR BLD: 2 % (ref 0–7)
ERYTHROCYTE [DISTWIDTH] IN BLOOD BY AUTOMATED COUNT: 16.1 % (ref 11.5–14.5)
GLOBULIN SER CALC-MCNC: 3.3 G/DL (ref 2–4)
GLUCOSE SERPL-MCNC: 71 MG/DL (ref 65–100)
HCT VFR BLD AUTO: 42.2 % (ref 36.6–50.3)
HGB BLD-MCNC: 13.1 G/DL (ref 12.1–17)
IMM GRANULOCYTES # BLD AUTO: 0 K/UL (ref 0–0.04)
IMM GRANULOCYTES NFR BLD AUTO: 0 % (ref 0–0.5)
LYMPHOCYTES # BLD: 2.3 K/UL (ref 0.8–3.5)
LYMPHOCYTES NFR BLD: 25 % (ref 12–49)
MCH RBC QN AUTO: 25 PG (ref 26–34)
MCHC RBC AUTO-ENTMCNC: 31 G/DL (ref 30–36.5)
MCV RBC AUTO: 80.5 FL (ref 80–99)
MONOCYTES # BLD: 0.7 K/UL (ref 0–1)
MONOCYTES NFR BLD: 7 % (ref 5–13)
NEUTS SEG # BLD: 5.8 K/UL (ref 1.8–8)
NEUTS SEG NFR BLD: 65 % (ref 32–75)
NRBC # BLD: 0 K/UL (ref 0–0.01)
NRBC BLD-RTO: 0 PER 100 WBC
PLATELET # BLD AUTO: 225 K/UL (ref 150–400)
PMV BLD AUTO: 11.7 FL (ref 8.9–12.9)
POTASSIUM SERPL-SCNC: 4 MMOL/L (ref 3.5–5.1)
PROT SERPL-MCNC: 7.3 G/DL (ref 6.4–8.2)
RBC # BLD AUTO: 5.24 M/UL (ref 4.1–5.7)
SODIUM SERPL-SCNC: 138 MMOL/L (ref 136–145)
WBC # BLD AUTO: 8.9 K/UL (ref 4.1–11.1)

## 2022-07-08 NOTE — PROGRESS NOTES
Your liver enzymes are a little elevated; I recommend that you limit alcohol and lets recheck in about 6 months. No concerns with any of your other labs, including kidneys, electrolytes, and blood counts.

## 2022-10-25 ENCOUNTER — OFFICE VISIT (OUTPATIENT)
Dept: FAMILY MEDICINE CLINIC | Age: 42
End: 2022-10-25
Payer: MEDICAID

## 2022-10-25 ENCOUNTER — NURSE TRIAGE (OUTPATIENT)
Dept: OTHER | Facility: CLINIC | Age: 42
End: 2022-10-25

## 2022-10-25 VITALS
RESPIRATION RATE: 16 BRPM | TEMPERATURE: 96.8 F | BODY MASS INDEX: 36.22 KG/M2 | DIASTOLIC BLOOD PRESSURE: 80 MMHG | WEIGHT: 239 LBS | OXYGEN SATURATION: 98 % | SYSTOLIC BLOOD PRESSURE: 122 MMHG | HEIGHT: 68 IN | HEART RATE: 62 BPM

## 2022-10-25 DIAGNOSIS — H81.11 BENIGN PAROXYSMAL POSITIONAL VERTIGO OF RIGHT EAR: Primary | ICD-10-CM

## 2022-10-25 PROBLEM — K21.9 GASTROESOPHAGEAL REFLUX DISEASE WITHOUT ESOPHAGITIS: Status: ACTIVE | Noted: 2022-10-25

## 2022-10-25 PROBLEM — I05.9 MITRAL VALVE DISEASE: Status: ACTIVE | Noted: 2022-10-25

## 2022-10-25 PROBLEM — I10 ESSENTIAL HYPERTENSION: Status: ACTIVE | Noted: 2022-10-25

## 2022-10-25 PROCEDURE — 3074F SYST BP LT 130 MM HG: CPT | Performed by: NURSE PRACTITIONER

## 2022-10-25 PROCEDURE — 3078F DIAST BP <80 MM HG: CPT | Performed by: NURSE PRACTITIONER

## 2022-10-25 PROCEDURE — 99214 OFFICE O/P EST MOD 30 MIN: CPT | Performed by: NURSE PRACTITIONER

## 2022-10-25 RX ORDER — MECLIZINE HYDROCHLORIDE 25 MG/1
25 TABLET ORAL
Qty: 30 TABLET | Refills: 0 | Status: SHIPPED | OUTPATIENT
Start: 2022-10-25 | End: 2022-11-04

## 2022-10-25 NOTE — PROGRESS NOTES
Chief Complaint   Patient presents with    Dizziness      For 1 month     1. \"Have you been to the ER, urgent care clinic since your last visit? Hospitalized since your last visit? \" No    2. \"Have you seen or consulted any other health care providers outside of the [address] [place] [address] since your last visit? \" No     3. For patients aged 39-70: Has the patient had a colonoscopy / FIT/ Cologuard? NA - based on age      If the patient is female:    4. For patients aged 41-77: Has the patient had a mammogram within the past 2 years? NA - based on age or sex      11. For patients aged 21-65: Has the patient had a pap smear?  NA - based on age or sex

## 2022-10-25 NOTE — PROGRESS NOTES
Chief Complaint   Patient presents with    Dizziness      For 1 month       HPI:     is a 38 y.o. male in today with a CC of dizziness x 1 month, worse when he wakes up in the morning. Symptoms come and go. He denies any staggering gait, falls. Worse when turning to the R. He felt like this before 2 years ago when he had a bad ear infection. He denies associated CP, SOB. He checks his BP at home and it's been \"good\", can't recall specific readings. No Known Allergies    Current Outpatient Medications   Medication Sig    meclizine (ANTIVERT) 25 mg tablet Take 1 Tablet by mouth three (3) times daily as needed for Dizziness for up to 10 days. omeprazole (PRILOSEC) 40 mg capsule Take 1 Capsule by mouth daily. losartan-hydroCHLOROthiazide (HYZAAR) 100-25 mg per tablet TAKE 1 TABLET BY MOUTH DAILY     No current facility-administered medications for this visit. Past Medical History:   Diagnosis Date    HTN (hypertension)        History reviewed. No pertinent family history. Review of Systems    A comprehensive review of systems was negative except for that written in the HPI. Patient is not experiencing chest pain radiating to the jaw and/or down the arms. Physical Examination:    /80 (BP 1 Location: Right upper arm, BP Patient Position: Sitting, BP Cuff Size: Large adult)   Pulse 62   Temp 96.8 °F (36 °C) (Temporal)   Resp 16   Ht 5' 8\" (1.727 m)   Wt 239 lb (108.4 kg)   SpO2 98%   BMI 36.34 kg/m²     Wt Readings from Last 3 Encounters:   10/25/22 239 lb (108.4 kg)   07/06/22 235 lb (106.6 kg)   02/25/22 258 lb 9.6 oz (117.3 kg)     Constitutional: WDWN male in no acute distress  HENT:  NC/AT, TMs pearly gray, OP: clear  EYES: EOMI, PERRL  Neck:  Supple, no JVD, mass or bruit. No thyromegaly. Respiratory:  Respirations even and unlabored without use of accessory muscles, CTA throughout without wheezes, rales, rubs or rhonchi. Symmetrical chest expansion.   Cardiac: RRR no clicks, murmurs, gallops, or rubs  Musculoskeletal:  No cyanosis, clubbing or edema of extremities. Moves all extremities without difficulty. Neurologic:  Smooth, even gait without assistance, CN 2-12 grossly intact. No nystagmus at the end of the gaze  Skin: intact and warm to the touch, no rash   Lymphadenopathy: no cervical or supraclavicular nodes  Psych: Pleasant and appropriate. Judgment normal. Alert and oriented x 3. ASSESSMENT AND PLAN:       ICD-10-CM ICD-9-CM    1. Benign paroxysmal positional vertigo of right ear  H81.11 386.11 meclizine (ANTIVERT) 25 mg tablet        Exam normal today, no red flag signs or symptoms. Continue BP medication. Trial of meclizine. I also reviewed with him the Epley maneuver, print out provided. If no improvement in 1-2 weeks, come back for labs including CBC, TSH, and possible EKG. Medication Side Effects and Warnings were discussed with patient:  yes  Patient Labs were reviewed:  yes  Patient Past Records were reviewed:  yes    Patient aware of plan of care and verbalized understanding. Questions answered. RTC PRN.     Penelope Johnston, NOAH

## 2022-10-25 NOTE — TELEPHONE ENCOUNTER
Location of patient: [facility] [address] call from H[place] at [facility] with [employer]. Current Symptoms: Pt reports dizziness and light headedness. Pt has a h/o vertigo. His blood pressure is currently 146/99. He is not experiencing symptoms right now. Onset: 3 days ago, intermittent     Associated Symptoms: NA    Pain Severity: Denies pain    Temperature: Denies fever    What has been tried: Sinus medication, Tylenol     LMP: NA Pregnant: NA    Recommended disposition: See in Office Today    Care advice provided, patient verbalizes understanding; denies any other questions or concerns; instructed to call back for any new or worsening symptoms. Patient/Caller agrees with recommended disposition; writer provided warm transfer to Boris Ramos at [facility] for appointment scheduling    Attention Provider: Thank you for allowing me to participate in the care of your patient. The patient was connected to triage in response to information provided to the [facility]. Please do not respond through this encounter as the response is not directed to a shared pool.     Reason for Disposition   Patient wants to be seen    Protocols used: Dizziness-ADULT-[place]

## 2022-12-16 DIAGNOSIS — K21.9 GASTROESOPHAGEAL REFLUX DISEASE WITHOUT ESOPHAGITIS: ICD-10-CM

## 2022-12-16 RX ORDER — OMEPRAZOLE 40 MG/1
40 CAPSULE, DELAYED RELEASE ORAL DAILY
Qty: 90 CAPSULE | Refills: 1 | Status: SHIPPED | OUTPATIENT
Start: 2022-12-16

## 2022-12-16 NOTE — TELEPHONE ENCOUNTER
Requested Prescriptions     Pending Prescriptions Disp Refills    omeprazole (PRILOSEC) 40 mg capsule 90 Capsule 1     Sig: Take 1 Capsule by mouth daily.

## 2023-01-20 DIAGNOSIS — K21.9 GASTROESOPHAGEAL REFLUX DISEASE WITHOUT ESOPHAGITIS: ICD-10-CM

## 2023-01-20 RX ORDER — OMEPRAZOLE 40 MG/1
CAPSULE, DELAYED RELEASE ORAL
Qty: 90 CAPSULE | Refills: 1 | Status: SHIPPED | OUTPATIENT
Start: 2023-01-20

## 2023-01-25 ENCOUNTER — OFFICE VISIT (OUTPATIENT)
Dept: FAMILY MEDICINE CLINIC | Age: 43
End: 2023-01-25
Payer: MEDICAID

## 2023-01-25 ENCOUNTER — HOSPITAL ENCOUNTER (OUTPATIENT)
Dept: GENERAL RADIOLOGY | Age: 43
Discharge: HOME | End: 2023-01-25
Payer: MEDICAID

## 2023-01-25 VITALS
SYSTOLIC BLOOD PRESSURE: 110 MMHG | OXYGEN SATURATION: 98 % | WEIGHT: 242 LBS | RESPIRATION RATE: 18 BRPM | TEMPERATURE: 97.3 F | HEIGHT: 68 IN | HEART RATE: 84 BPM | DIASTOLIC BLOOD PRESSURE: 60 MMHG | BODY MASS INDEX: 36.68 KG/M2

## 2023-01-25 DIAGNOSIS — M54.2 NECK PAIN: ICD-10-CM

## 2023-01-25 DIAGNOSIS — M25.511 ACUTE PAIN OF RIGHT SHOULDER: ICD-10-CM

## 2023-01-25 DIAGNOSIS — M54.2 NECK PAIN: Primary | ICD-10-CM

## 2023-01-25 PROCEDURE — 72050 X-RAY EXAM NECK SPINE 4/5VWS: CPT

## 2023-01-25 PROCEDURE — 73030 X-RAY EXAM OF SHOULDER: CPT

## 2023-01-25 PROCEDURE — 3078F DIAST BP <80 MM HG: CPT | Performed by: NURSE PRACTITIONER

## 2023-01-25 PROCEDURE — 99213 OFFICE O/P EST LOW 20 MIN: CPT | Performed by: NURSE PRACTITIONER

## 2023-01-25 PROCEDURE — 3074F SYST BP LT 130 MM HG: CPT | Performed by: NURSE PRACTITIONER

## 2023-01-25 RX ORDER — CYCLOBENZAPRINE HCL 10 MG
10 TABLET ORAL
Qty: 30 TABLET | Refills: 0 | Status: SHIPPED | OUTPATIENT
Start: 2023-01-25

## 2023-01-25 RX ORDER — IBUPROFEN 800 MG/1
800 TABLET ORAL
Qty: 30 TABLET | Refills: 0 | Status: SHIPPED | OUTPATIENT
Start: 2023-01-25

## 2023-01-25 NOTE — PROGRESS NOTES
Chief Complaint   Patient presents with    Shoulder Pain     right     1. \"Have you been to the ER, urgent care clinic since your last visit? Hospitalized since your last visit? \" No    2. \"Have you seen or consulted any other health care providers outside of the [address] [place] [address] since your last visit? \" No     3. For patients aged 39-70: Has the patient had a colonoscopy / FIT/ Cologuard? NA - based on age      If the patient is female:    4. For patients aged 41-77: Has the patient had a mammogram within the past 2 years? NA - based on age or sex      11. For patients aged 21-65: Has the patient had a pap smear?  NA - based on age or sex

## 2023-01-26 NOTE — PROGRESS NOTES
Subjective:      Wade Reyes is an 38 y.o. male who presents for evaluation and treatment   of neck and shoulder  pain. Onset of symptoms 2 weeks ago, gradually worsening since that time. Current symptoms are pain in left side of neck extending  (aching, pulsating, sharp, cramping in character; 10/10 in severity), stiffness in neck , denies weakness, denies numbness. Patient denies numbness, tingling, paresthesias in upper extremities. Patient denies weakness, diminished  strength, lack of coordination. Event that precipitate these symptoms: none known. Patient has had no prior neck problems. Past Medical History:   Diagnosis Date    HTN (hypertension)      History reviewed. No pertinent family history. Current Outpatient Medications   Medication Sig Dispense Refill    cyclobenzaprine (FLEXERIL) 10 mg tablet Take 1 Tablet by mouth three (3) times daily as needed for Muscle Spasm(s). Indications: muscle spasm 30 Tablet 0    ibuprofen (MOTRIN) 800 mg tablet Take 1 Tablet by mouth every eight (8) hours as needed for Pain. Indications: minor musculoskeletal injury 30 Tablet 0    omeprazole (PRILOSEC) 40 mg capsule TAKE 1 CAPSULE BY MOUTH EVERY DAY 90 Capsule 1    losartan-hydroCHLOROthiazide (HYZAAR) 100-25 mg per tablet TAKE 1 TABLET BY MOUTH DAILY 90 Tablet 1     No Known Allergies  Social History     Socioeconomic History    Marital status: SINGLE     Spouse name: Not on file    Number of children: Not on file    Years of education: Not on file    Highest education level: Not on file   Occupational History    Not on file   Tobacco Use    Smoking status: Former     Packs/day: 0.50     Types: Cigarettes    Smokeless tobacco: Never   Vaping Use    Vaping Use: Former   Substance and Sexual Activity    Alcohol use:  Yes     Alcohol/week: 15.0 standard drinks     Types: 5 Cans of beer, 10 Shots of liquor per week    Drug use: Yes     Types: Marijuana    Sexual activity: Not on file   Other Topics Concern    Not on file   Social History Narrative    Not on file     Social Determinants of Health     Financial Resource Strain: Low Risk     Difficulty of Paying Living Expenses: Not hard at all   Food Insecurity: No Food Insecurity    Worried About [address] in the Last Year: Never true    [facility] in the Last Year: Never true   Transportation Needs: No Transportation Needs    Lack of Transportation (Medical): No    Lack of Transportation (Non-Medical): No   Physical Activity: Not on file   Stress: Not on file   Social Connections: Not on file   Intimate Partner Violence: Not on file   Housing Stability: Not on file       Review of Systems  Pertinent items are noted in HPI. Objective:     Visit Vitals  /60 (BP 1 Location: Left upper arm, BP Patient Position: Sitting, BP Cuff Size: Large adult)   Pulse 84   Temp 97.3 °F (36.3 °C) (Temporal)   Resp 18   Ht 5' 8\" (1.727 m)   Wt 242 lb (109.8 kg)   SpO2 98%   BMI 36.80 kg/m²     General:   alert, cooperative, no distress, appears stated age   External Deformity:  no   Midline Tenderness:  moderate on the right   Paraspinous tenderness:  moderate on the right   UE Neurologic Exam:  unremarkable   ROM Cervical Spine:  limited left rotation     X-ray of the cervical spine:   Ordered     Assessment:     1. Neck pain    - XR SPINE CERV 4 OR 5 V; Future    2. Acute pain of right shoulder    - XR SHOULDER RT AP/LAT MIN 2 V; Future  Severity of pain: moderate    Plan:   Discussed the cervical pain, its course and treatment. Appropriate educational materials distributed. Discussed appropriate exercises. now using NSAIDs per med orders. now using muscle relaxants per med orders.   Referred to PT  RTC in 1 month  julio cesar SIEGELC

## 2023-01-26 NOTE — ACP (ADVANCE CARE PLANNING)
Discussed importance of advanced medical directives with patient. Patient is capable of making decisions.   Rosemary Delarosa NP-C

## 2023-01-26 NOTE — PROGRESS NOTES
Patient verified by stating name and date of birth. Patient informed of XR results and states understanding per Ramon Pompa.  Please place referral for PT.

## 2023-02-09 ENCOUNTER — OFFICE VISIT (OUTPATIENT)
Dept: FAMILY MEDICINE CLINIC | Age: 43
End: 2023-02-09
Payer: MEDICAID

## 2023-02-09 ENCOUNTER — HOSPITAL ENCOUNTER (OUTPATIENT)
Dept: GENERAL RADIOLOGY | Age: 43
Discharge: HOME | End: 2023-02-09
Payer: MEDICAID

## 2023-02-09 VITALS
HEART RATE: 58 BPM | WEIGHT: 242 LBS | SYSTOLIC BLOOD PRESSURE: 120 MMHG | OXYGEN SATURATION: 98 % | DIASTOLIC BLOOD PRESSURE: 84 MMHG | TEMPERATURE: 97.3 F | RESPIRATION RATE: 16 BRPM | BODY MASS INDEX: 36.68 KG/M2 | HEIGHT: 68 IN

## 2023-02-09 DIAGNOSIS — R60.0 EDEMA OF HAND: ICD-10-CM

## 2023-02-09 DIAGNOSIS — S16.1XXD STRAIN OF NECK MUSCLE, SUBSEQUENT ENCOUNTER: ICD-10-CM

## 2023-02-09 DIAGNOSIS — M54.2 NECK PAIN: Primary | ICD-10-CM

## 2023-02-09 DIAGNOSIS — Z71.89 ACP (ADVANCE CARE PLANNING): ICD-10-CM

## 2023-02-09 PROCEDURE — 3074F SYST BP LT 130 MM HG: CPT | Performed by: NURSE PRACTITIONER

## 2023-02-09 PROCEDURE — 99213 OFFICE O/P EST LOW 20 MIN: CPT | Performed by: NURSE PRACTITIONER

## 2023-02-09 PROCEDURE — 3078F DIAST BP <80 MM HG: CPT | Performed by: NURSE PRACTITIONER

## 2023-02-09 PROCEDURE — 73130 X-RAY EXAM OF HAND: CPT

## 2023-02-09 NOTE — PROGRESS NOTES
Chief Complaint   Patient presents with    Shoulder Pain     right    Finger Swelling     Left index finger     YES Answers must have Comments  1. \"Have you been to the ER, urgent care clinic since your last visit? Hospitalized since your last visit? \"    [] YES   [x] NO       2. Have you seen or consulted any other health care providers outside of [address] [place] [address] since your last visit?     [] YES   [x] NO       3. For patients aged 39-70: Have you had a colorectal cancer screening such as a colonoscopy/FIT/Cologuard? Nurse/CMA to request records if not in chart   [] YES   [] NO   [x] NA, based on age    If the patient is female:      4. For female patients aged 41-77: Vera Capone you had a mammogram in the last two years?  Nurse/CMA to request records if not in chart   [] YES   [] NO   [x] NA, based on age    11. For female patients aged 21-65: Vera Capone you had a pap smear?   Nurse/CMA to request records if not in chart   [] YES   [] NO  [x] NA, based on age

## 2023-02-10 ENCOUNTER — PATIENT OUTREACH (OUTPATIENT)
Dept: CASE MANAGEMENT | Age: 43
End: 2023-02-10

## 2023-02-10 NOTE — ACP (ADVANCE CARE PLANNING)
Discussed importance of advanced medical directives with patient. Patient is capable of making decisions.   Richie Padron NP-C

## 2023-02-10 NOTE — PROGRESS NOTES
Left hand Xray  -There is joint space narrowing  with a subchondral cyst.  Consider a referral for hand specialist.

## 2023-02-10 NOTE — PROGRESS NOTES
Subjective:     Tia Casas is a 38 y.o. male who presents today with the following:  Chief Complaint   Patient presents with    Hand Pain    Hand Swelling       Patient Active Problem List   Diagnosis Code    Severe obesity ([facility] E66.01    Essential hypertension I10    Gastroesophageal reflux disease without esophagitis K21.9    Mitral valve disease I05.9         COMPLIANT WITH MEDICATION:   Denies chest pain, dyspnea, palpitations, headache and blurred vision. Blood pressure normotensive. Hand pain and swelling; He endorses the area surrounding and between the thumb and forefinger are swollen and tender. He denies any trauma or injury. Neck strain; approximately one month . No abnormalities noted on imaging . We discussed PT as the next step. depression screening addressed not at risk    abuse screening addressed denies    learning assessment addressed reviewed nurses notes    fall risk addressed not at risk    HM: addressed up to date    ROS:  Gen: denies fever, chills, fatigue, weight loss, weight gain  HEENT:denies blurry vision, nasal congestion, sore throat  Resp: denies dypsnea, cough, wheezing  CV: denies chest pain radiating to the jaws or arms, palpitations, lower extremity edema  Abd: denies nausea, vomiting, diarrhea, constipation  Neuro: denies numbness/tingling  Endo: denies polyuria, polydipsia, heat/cold intolerance  Heme: no lymphadenopathy    No Known Allergies      Current Outpatient Medications:     cyclobenzaprine (FLEXERIL) 10 mg tablet, Take 1 Tablet by mouth three (3) times daily as needed for Muscle Spasm(s). Indications: muscle spasm, Disp: 30 Tablet, Rfl: 0    ibuprofen (MOTRIN) 800 mg tablet, Take 1 Tablet by mouth every eight (8) hours as needed for Pain.  Indications: minor musculoskeletal injury, Disp: 30 Tablet, Rfl: 0    omeprazole (PRILOSEC) 40 mg capsule, TAKE 1 CAPSULE BY MOUTH EVERY DAY, Disp: 90 Capsule, Rfl: 1    losartan-hydroCHLOROthiazide (HYZAAR) 100-25 mg per tablet, TAKE 1 TABLET BY MOUTH DAILY, Disp: 90 Tablet, Rfl: 1    Past Medical History:   Diagnosis Date    HTN (hypertension)        History reviewed. No pertinent surgical history. Social History     Tobacco Use   Smoking Status Former    Packs/day: 0.50    Types: Cigarettes   Smokeless Tobacco Never       Social History     Socioeconomic History    Marital status: SINGLE   Tobacco Use    Smoking status: Former     Packs/day: 0.50     Types: Cigarettes    Smokeless tobacco: Never   Vaping Use    Vaping Use: Former   Substance and Sexual Activity    Alcohol use: Yes     Alcohol/week: 15.0 standard drinks     Types: 5 Cans of beer, 10 Shots of liquor per week    Drug use: Yes     Types: Marijuana     Social Determinants of Health     Financial Resource Strain: Low Risk     Difficulty of Paying Living Expenses: Not hard at all   Food Insecurity: No Food Insecurity    Worried About Running Out of Food in the Last Year: Never true    Ran Out of Food in the Last Year: Never true   Transportation Needs: No Transportation Needs    Lack of Transportation (Medical): No    Lack of Transportation (Non-Medical): No       History reviewed. No pertinent family history. Objective:     Visit Vitals  /84 (BP 1 Location: Right upper arm, BP Patient Position: Sitting, BP Cuff Size: Large adult)   Pulse (!) 58   Temp 97.3 °F (36.3 °C) (Temporal)   Resp 16   Ht 5' 8\" (1.727 m)   Wt 242 lb (109.8 kg)   SpO2 98%   BMI 36.80 kg/m²     Body mass index is 36.8 kg/m². General: Alert and oriented. No acute distress. Well nourished  HEENT :  Ears:TMs are normal. Canals are clear. Eyes: pupils equal, round, react to light and accommodation. Nose: patent. Mouth and throat is clear. Neck:supple full range of motion no thyromegaly. Trachea midline, No carotid bruits. No significant lymphadenopathy  Lungs[de-identified] clear to auscultation without wheezes, rales, or rhonchi. Heart :RRR, S1 & S2 are normal intensity.  No murmur; no gallop  Extremities: without clubbing or cyanosis, (+) edema from the thenar space encompassing the thumb and index finger. Tender to touch, no exudate no heat and full ROM . Pulses: radial and femoral pulses are normal  Neuro: HMF intact. Cranial nerves II through XII grossly normal.  Motor: is 5 over 5 and symmetrical.   Deep tendon reflexes: +2 equal  Psych:appropriate behavior, mood, affect and judgement. Results for orders placed or performed in visit on 42/27/48   METABOLIC PANEL, COMPREHENSIVE   Result Value Ref Range    Sodium 138 136 - 145 mmol/L    Potassium 4.0 3.5 - 5.1 mmol/L    Chloride 104 97 - 108 mmol/L    CO2 26 21 - 32 mmol/L    Anion gap 8 5 - 15 mmol/L    Glucose 71 65 - 100 mg/dL    BUN 15 6 - 20 MG/DL    Creatinine 0.90 0.70 - 1.30 MG/DL    BUN/Creatinine ratio 17 12 - 20      GFR est AA >60 >60 ml/min/1.73m2    GFR est non-AA >60 >60 ml/min/1.73m2    Calcium 9.6 8.5 - 10.1 MG/DL    Bilirubin, total 0.3 0.2 - 1.0 MG/DL    ALT (SGPT) 136 (H) 12 - 78 U/L    AST (SGOT) 54 (H) 15 - 37 U/L    Alk. phosphatase 61 45 - 117 U/L    Protein, total 7.3 6.4 - 8.2 g/dL    Albumin 4.0 3.5 - 5.0 g/dL    Globulin 3.3 2.0 - 4.0 g/dL    A-G Ratio 1.2 1.1 - 2.2     CBC WITH AUTOMATED DIFF   Result Value Ref Range    WBC 8.9 4.1 - 11.1 K/uL    RBC 5.24 4.10 - 5.70 M/uL    HGB 13.1 12.1 - 17.0 g/dL    HCT 42.2 36.6 - 50.3 %    MCV 80.5 80.0 - 99.0 FL    MCH 25.0 (L) 26.0 - 34.0 PG    MCHC 31.0 30.0 - 36.5 g/dL    RDW 16.1 (H) 11.5 - 14.5 %    PLATELET 453 287 - 284 K/uL    MPV 11.7 8.9 - 12.9 FL    NRBC 0.0 0  WBC    ABSOLUTE NRBC 0.00 0.00 - 0.01 K/uL    NEUTROPHILS 65 32 - 75 %    LYMPHOCYTES 25 12 - 49 %    MONOCYTES 7 5 - 13 %    EOSINOPHILS 2 0 - 7 %    BASOPHILS 1 0 - 1 %    IMMATURE GRANULOCYTES 0 0.0 - 0.5 %    ABS. NEUTROPHILS 5.8 1.8 - 8.0 K/UL    ABS. LYMPHOCYTES 2.3 0.8 - 3.5 K/UL    ABS. MONOCYTES 0.7 0.0 - 1.0 K/UL    ABS. EOSINOPHILS 0.2 0.0 - 0.4 K/UL    ABS.  BASOPHILS 0.1 0.0 - 0.1 K/UL ABS. IMM. GRANS. 0.0 0.00 - 0.04 K/UL    DF AUTOMATED         No results found for this visit on 02/09/23. Assessment/ Plan:     1. ACP (advance care planning)    - REFERRAL TO ACP CLINICAL SPECIALIST    2. Strain of neck muscle, subsequent encounter  We discussed both pharmacologic and nonpharmacologic interventions  - REFERRAL TO PHYSICAL THERAPY; Future    3. Neck pain  We discussed both pharmacologic and nonpharmacologic interventions  - REFERRAL TO PHYSICAL THERAPY; Future    4. Edema of hand    - XR HAND LT MIN 3 V; Future      Orders Placed This Encounter    XR HAND LT MIN 3 V     Standing Status:   Future     Number of Occurrences:   1     Standing Expiration Date:   3/9/2023     Order Specific Question:   Which facility to perform procedure? Answer:   [address] [place] [address] TO [facility] CLINICAL SPECIALIST     Referral Priority:   Routine     Referral Type: Other     Referral Reason:   Specialty Services Required     Number of Visits Requested:   1    REFERRAL TO PHYSICAL THERAPY     Standing Status:   Future     Standing Expiration Date:   2/9/2024     Referral Priority:   Routine     Referral Type:   PT/OT/ST     Referral Reason:   Specialty Services Required     Referral Location:   Abilities Abound     Referred to Provider:   Antonella Owens PT, DPT         Verbal and written instructions (see AVS) provided. Patient expresses understanding of diagnosis and treatment plan. There are no preventive care reminders to display for this patient.             NUSRAT Rodriges

## 2023-02-10 NOTE — ACP (ADVANCE CARE PLANNING)
Advance Care Planning   Ambulatory ACP Specialist Patient Outreach    Date:  2/10/2023    ACP Specialist:  Jessy Roa    Outreach call to patient in follow-up to ACP Specialist referral from:  Annel Way NP    [x] PCP  [] Provider   [] Ambulatory Care Management [] Other     For:                  [x] Advance Directive Assistance              [] Complete Portable DNR order              [] Complete POST/MOST              [] Code Status Discussion             [] Discuss Goals of Care             [] Early ACP Decision-Making              [] Other (Specify)    Date Referral Received:  2/9/2023    Today's Outreach:  [x] First   [] Second  [] Third       Third outreach made by: [] Phone  [] Email / mail    [] indicohart     Intervention:  [x] Spoke with Patient   [] Left VM requesting return call      Outcome:     Patient is agreeable to a conversation with ACP Specialist Luisa Cleaning on Tuesday, February 14, 2023 at 1:00 PM.  Copy of [facility] and ACP information sheets emailed to patient's confirmed email address on file. Next Step:   [x] ACP scheduled conversation  [] Outreach again in one week               [x] Email / Mail ACP Info Sheets  [x] Email / Mail Advance Directive   [] Closing referral.  Routing closure to referring provider/staff and to ACP Specialist . [] Closure letter mailed to patient with invitation to contact ACP Specialist if / when ready.   Thank you for this referral.

## 2023-02-14 ENCOUNTER — DOCUMENTATION ONLY (OUTPATIENT)
Dept: CASE MANAGEMENT | Age: 43
End: 2023-02-14

## 2023-02-14 NOTE — ACP (ADVANCE CARE PLANNING)
Advance Care Planning   Ambulatory ACP Specialist Patient Outreach    Date:  2/14/2023    ACP Specialist:  Talon Steele LCSW    Outreach call to patient in follow-up to ACP Specialist referral from:    [x] PCP  [] Provider   [] Ambulatory Care Management [] Other     For:                  [x] Advance Directive Assistance              [] Complete Portable DNR order              [] Complete POST/MOST              [] Code Status Discussion             [] Discuss Goals of Care             [] Early ACP Decision-Making              [] Other (Specify)    Date Referral Received: 2/9/2023    Today's Outreach:  [] First   [x] Second  [] Third       Third outreach made by: [] Phone  [] Email / mail    [] MyChart     Intervention:  [] Spoke with Patient   [] Left VM requesting return call      Outcome: ACP Specialist made a reminder call to patient yesterday on 2/13/2023 and a call today 2/14/2023 for the scheduled appointment and both instances, patient did not answer the phone and there was no voice mail box to leave a message. ACP Specialist will attempt to reach patient again next week to see if he wants to proceed with this ACP appointment and get a rescheduled date. Next Step:   [] ACP scheduled conversation  [x] Outreach again in one week               [] Email / Mail ACP Info Sheets  [] Email / Mail Advance Directive   [] Closing referral.  Routing closure to referring provider/staff and to ACP Specialist . [] Closure letter mailed to patient with invitation to contact ACP Specialist if / when ready. Thank you for this referral.     Abhay Oneal.  TK Sousa  [facility]   [phone]

## 2023-02-23 ENCOUNTER — OFFICE VISIT (OUTPATIENT)
Dept: FAMILY MEDICINE CLINIC | Age: 43
End: 2023-02-23
Payer: MEDICAID

## 2023-02-23 VITALS
RESPIRATION RATE: 20 BRPM | HEART RATE: 62 BPM | SYSTOLIC BLOOD PRESSURE: 118 MMHG | BODY MASS INDEX: 36.04 KG/M2 | HEIGHT: 68 IN | WEIGHT: 237.8 LBS | DIASTOLIC BLOOD PRESSURE: 70 MMHG | OXYGEN SATURATION: 96 % | TEMPERATURE: 97.5 F

## 2023-02-23 DIAGNOSIS — L03.012 PARONYCHIA OF LEFT INDEX FINGER: Primary | ICD-10-CM

## 2023-02-23 DIAGNOSIS — L02.512 CUTANEOUS ABSCESS OF LEFT HAND: ICD-10-CM

## 2023-02-23 RX ORDER — AMOXICILLIN AND CLAVULANATE POTASSIUM 875; 125 MG/1; MG/1
1 TABLET, FILM COATED ORAL 2 TIMES DAILY
Qty: 20 TABLET | Refills: 0 | Status: SHIPPED | OUTPATIENT
Start: 2023-02-23 | End: 2023-03-05

## 2023-02-23 NOTE — PROGRESS NOTES
Chief Complaint   Patient presents with    Finger Swelling     Left hand / pointer finger, slightly tender, (seen a little pus drainage x 4 days ago)   YES Answers must have Comments  1. \"Have you been to the ER, urgent care clinic since your last visit? Hospitalized since your last visit? \"    [] YES   [x] NO       2. Have you seen or consulted any other health care providers outside of [address] [place] [address] since your last visit?     [] YES   [x] NO       3. For patients aged 39-70: Have you had a colorectal cancer screening such as a colonoscopy/FIT/Cologuard? Nurse/CMA to request records if not in chart   [] YES   [] NO   [x] NA, based on age    If the patient is female:      4. For female patients aged 41-77: Rakesh Vega you had a mammogram in the last two years?  Nurse/CMA to request records if not in chart   [] YES   [] NO   [x] NA, based on age    11. For female patients aged 21-65: Rakesh Vega you had a pap smear?   Nurse/CMA to request records if not in chart   [] YES   [] NO  [x] NA, based on age

## 2023-02-24 NOTE — PROGRESS NOTES
Subjective:    Varghese Johnson is a 38 y.o. male who presents for lesion removal. We have discussed this procedure, including option of not performing surgery, technique of surgery and potential for scarring at an earlier visit. Oubjective:   Patient appears well. Visit Vitals  /70 (BP 1 Location: Left arm, BP Patient Position: Sitting, BP Cuff Size: Large adult)   Pulse 62   Temp 97.5 °F (36.4 °C) (Temporal)   Resp 20   Ht 5' 8\" (1.727 m)   Wt 237 lb 12.8 oz (107.9 kg)   SpO2 96%   BMI 36.16 kg/m²     Skin: abscess left hand index finger     Assessment:   skin abscess    ICD-10-CM ICD-9-CM    1. Paronychia of left index finger  L03.012 681.02 CULTURE, BODY FLUID W GRAM STAIN      CULTURE, BODY FLUID W GRAM STAIN      amoxicillin-clavulanate (AUGMENTIN) 875-125 mg per tablet      DRAIN SKIN ABSCESS SIMPLE      2.  Cutaneous abscess of left hand  L02.512 682.4 DRAIN SKIN ABSCESS SIMPLE           Procedure Note:   [address] AT [address] [facility], [place]  OFFICE PROCEDURE PROGRESS NOTE        Chart reviewed for the following:   James COPE NP, have reviewed the History, Physical and updated the Allergic reactions for [address] [place] [address] performed immediately prior to start of procedure:   James COPE NP, have performed the following reviews on Varghese Johnson prior to the start of the procedure:            * Patient was identified by name and date of birth   * Agreement on procedure being performed was verified  * Risks and Benefits explained to the patient  * Procedure site verified and marked as necessary  * Patient was positioned for comfort  * Consent was signed and verified     Time: 5:02 pm      Date of procedure: 2/24/2023    Procedure performed by:  James Patterson NP    Provider assisted by: Parth Ibarra MA    Patient assisted by: self    How tolerated by patient: tolerated the procedure well with no complications    Post Procedural Pain Scale: 0 - No Hurt    Comments: none After informed consent was obtained, using chlorhexidine for cleansing and 1% lidocaine for anesthetic, with sterile technique, incision and drainage of abscess was performed. Antibiotic dressing is applied, and wound care instructions provided. Be alert for any signs of cutaneous infection. The procedure was well tolerated without complications. Follow up: the specimen is labeled and sent to microbiology for evaluation, the patient may return prn.      Valentina BARTLETT

## 2023-02-24 NOTE — ACP (ADVANCE CARE PLANNING)
Discussed importance of advanced medical directives with patient. Patient is capable of making decisions.   Josue Watt NP-C

## 2023-02-26 LAB
BACTERIA SPEC CULT: NORMAL
GRAM STN SPEC: NORMAL
SERVICE CMNT-IMP: NORMAL

## 2023-02-28 ENCOUNTER — OFFICE VISIT (OUTPATIENT)
Dept: FAMILY MEDICINE CLINIC | Age: 43
End: 2023-02-28
Payer: MEDICAID

## 2023-02-28 VITALS
SYSTOLIC BLOOD PRESSURE: 122 MMHG | TEMPERATURE: 96.8 F | OXYGEN SATURATION: 99 % | RESPIRATION RATE: 18 BRPM | BODY MASS INDEX: 36.98 KG/M2 | DIASTOLIC BLOOD PRESSURE: 78 MMHG | HEIGHT: 68 IN | HEART RATE: 72 BPM | WEIGHT: 244 LBS

## 2023-02-28 DIAGNOSIS — Z51.89 WOUND CHECK, ABSCESS: Primary | ICD-10-CM

## 2023-02-28 PROCEDURE — 3078F DIAST BP <80 MM HG: CPT | Performed by: NURSE PRACTITIONER

## 2023-02-28 PROCEDURE — 99024 POSTOP FOLLOW-UP VISIT: CPT | Performed by: NURSE PRACTITIONER

## 2023-02-28 PROCEDURE — 3074F SYST BP LT 130 MM HG: CPT | Performed by: NURSE PRACTITIONER

## 2023-02-28 NOTE — PROGRESS NOTES
Chief Complaint   Patient presents with    Abscess     Followup left hand     YES Answers must have Comments  1. \"Have you been to the ER, urgent care clinic since your last visit? Hospitalized since your last visit? \"    [] YES   [x] NO       2. Have you seen or consulted any other health care providers outside of [address] [place] [address] since your last visit?     [] YES   [x] NO       3. For patients aged 39-70: Have you had a colorectal cancer screening such as a colonoscopy/FIT/Cologuard? Nurse/CMA to request records if not in chart   [] YES   [] NO   [x] NA, based on age    If the patient is female:      4. For female patients aged 41-77: Jason Buster you had a mammogram in the last two years?  Nurse/CMA to request records if not in chart   [] YES   [] NO   [x] NA, based on age    11. For female patients aged 21-65: Jason Buster you had a pap smear?   Nurse/CMA to request records if not in chart   [] YES   [] NO  [x] NA, based on age

## 2023-03-01 NOTE — ACP (ADVANCE CARE PLANNING)
Discussed importance of advanced medical directives with patient. Patient is capable of making decisions.  Jenn Tse NP-C

## 2023-03-01 NOTE — PROGRESS NOTES
Subjective:      Mohan Kay is a 38 y.o. male who presents today for wound check. He has a I&D site wound which is located on the index finger of left hand. .   Current symptoms: none  wound healing as expected. Interventions to date: started on antibiotics 5 days ago. Objective:     Visit Vitals  /78 (BP 1 Location: Right upper arm, BP Patient Position: Sitting, BP Cuff Size: Large adult)   Pulse 72   Temp 96.8 °F (36 °C) (Temporal)   Resp 18   Ht 5' 8\" (1.727 m)   Wt 244 lb (110.7 kg)   SpO2 99%   BMI 37.10 kg/m²       Wound:   wound margins intact and healing well. No signs of infection. Assessment:     Wound check. Interventions today visual inspection. Plan:     1. Discussed appropriate home care of this wound. , Antibiotics per orders. 2. Patient instructions were given.   3. Follow up: julio cesar BARTLETT .

## 2023-03-03 ENCOUNTER — DOCUMENTATION ONLY (OUTPATIENT)
Dept: CASE MANAGEMENT | Age: 43
End: 2023-03-03

## 2023-03-03 NOTE — ACP (ADVANCE CARE PLANNING)
Advance Care Planning   Ambulatory ACP Specialist Patient Outreach    Date:  3/3/2023    ACP Specialist:  Ramesh Trinidad LCSW    Outreach call to patient in follow-up to ACP Specialist referral from:    [x] PCP  [] Provider   [] Ambulatory Care Management [] Other     For:                  [x] Advance Directive Assistance              [] Complete Portable DNR order              [] Complete POST/MOST              [] Code Status Discussion             [] Discuss Goals of Care             [] Early ACP Decision-Making              [] Other (Specify)    Date Referral Received: 2/9/2023    Today's Outreach:  [] First   [] Second  [x] Third       Third outreach made by: [] Phone  [] Email / mail    [] MyChart     Intervention:  [] Spoke with Patient   [] Left VM requesting return call      Outcome:  ACP Specialist made a follow up call to patient to see if he had any desire to try and reschedule an ACP appointment. Call was not answered and no voice mail available. A letter will be mailed to patient's home address requesting a return phone call if he wants to proceed with Advance Care Planning. We will close this referral and reopen if patient calls back requesting a new appointment. Next Step:   [] ACP scheduled conversation  [] Outreach again in one week               [] Email / Mail ACP Info Sheets  [] Email / Mail Advance Directive   [x] Closing referral.  Routing closure to referring provider/staff and to ACP Specialist . [] Closure letter mailed to patient with invitation to contact ACP Specialist if / when ready. Thank you for this referral.     Mary Georges.  TK Coley  [facility]   [phone]

## 2023-03-14 ENCOUNTER — VIRTUAL VISIT (OUTPATIENT)
Dept: FAMILY MEDICINE CLINIC | Age: 43
End: 2023-03-14

## 2023-03-14 DIAGNOSIS — G89.29 CHRONIC LEFT-SIDED THORACIC BACK PAIN: Primary | ICD-10-CM

## 2023-03-14 DIAGNOSIS — M54.2 NECK PAIN: ICD-10-CM

## 2023-03-14 DIAGNOSIS — M54.6 CHRONIC LEFT-SIDED THORACIC BACK PAIN: Primary | ICD-10-CM

## 2023-03-14 NOTE — PROGRESS NOTES
Chief Complaint   Patient presents with    Back Pain     YES Answers must have Comments  1. \"Have you been to the ER, urgent care clinic since your last visit? Hospitalized since your last visit? \"    [] YES   [x] NO       2. Have you seen or consulted any other health care providers outside of [address] [place] [address] since your last visit?     [] YES   [x] NO       3. For patients aged 39-70: Have you had a colorectal cancer screening such as a colonoscopy/FIT/Cologuard? Nurse/CMA to request records if not in chart   [] YES   [] NO   ] NA, based on age    If the patient is female:      4. For female patients aged 41-77: Blanca Valadez you had a mammogram in the last two years?  Nurse/CMA to request records if not in chart   [] YES   [] NO   [x] NA, based on age    11. For female patients aged 21-65: Ramsolis Valadez you had a pap smear?   Nurse/CMA to request records if not in chart   [] YES   [x] NO  [] NA, based on age

## 2023-03-17 NOTE — PROGRESS NOTES
Kelsea Mahmood is a 38 y.o. male, evaluated via audio-only technology on 3/14/2023 for Back Pain  . Mr. Gena Jones endorses back and neck pain x 1 month. He is attending PT and alternating heat and ice to the areas. Flexeril is helping relax the muscles and help with PT. We discussed continuing with PT ., heat,ice and muscle relaxant for PT. Assessment & Plan:   Diagnoses and all orders for this visit:    1. Chronic left-sided thoracic back pain    2. Neck pain    We discussed continuing with PT ., heat,ice and muscle relaxant for PT. The complexity of medical decision making for this visit is moderate       12  Subjective:       Prior to Admission medications    Medication Sig Start Date End Date Taking? Authorizing Provider   cyclobenzaprine (FLEXERIL) 10 mg tablet Take 1 Tablet by mouth three (3) times daily as needed for Muscle Spasm(s). Indications: muscle spasm 1/25/23   Contreras Thompson NP   ibuprofen (MOTRIN) 800 mg tablet Take 1 Tablet by mouth every eight (8) hours as needed for Pain. Indications: minor musculoskeletal injury 1/25/23   Contreras Thompson NP   omeprazole (PRILOSEC) 40 mg capsule TAKE 1 CAPSULE BY MOUTH EVERY DAY 1/20/23   Jose GREGG NP   losartan-hydroCHLOROthiazide (HYZAAR) 100-25 mg per tablet TAKE 1 TABLET BY MOUTH DAILY 6/1/22   Contreras Thompson NP     Patient Active Problem List   Diagnosis Code    Severe obesity ([facility] [address] E66.01    Essential hypertension I10    Gastroesophageal reflux disease without esophagitis K21.9    Mitral valve disease I05.9     Patient Active Problem List    Diagnosis Date Noted    Essential hypertension 10/25/2022    Gastroesophageal reflux disease without esophagitis 10/25/2022    Mitral valve disease 10/25/2022    Severe obesity ([facility] [address] 05/06/2019     Current Outpatient Medications   Medication Sig Dispense Refill    cyclobenzaprine (FLEXERIL) 10 mg tablet Take 1 Tablet by mouth three (3) times daily as needed for Muscle Spasm(s).  Indications: muscle spasm 30 Tablet 0    ibuprofen (MOTRIN) 800 mg tablet Take 1 Tablet by mouth every eight (8) hours as needed for Pain. Indications: minor musculoskeletal injury 30 Tablet 0    omeprazole (PRILOSEC) 40 mg capsule TAKE 1 CAPSULE BY MOUTH EVERY DAY 90 Capsule 1    losartan-hydroCHLOROthiazide (HYZAAR) 100-25 mg per tablet TAKE 1 TABLET BY MOUTH DAILY 90 Tablet 1     No Known Allergies  Past Medical History:   Diagnosis Date    HTN (hypertension)      History reviewed. No pertinent surgical history. History reviewed. No pertinent family history. Social History     Tobacco Use    Smoking status: Former     Packs/day: 0.50     Types: Cigarettes    Smokeless tobacco: Never   Substance Use Topics    Alcohol use: Yes     Alcohol/week: 15.0 standard drinks     Types: 5 Cans of beer, 10 Shots of liquor per week       ROS  Pertinent items are noted on the HPI  No data recorded     Varghese Johnson was evaluated through a patient-initiated, synchronous (real-time) audio only encounter. He (or guardian if applicable) is aware that it is a billable service, which includes applicable co-pays, with coverage as determined by his insurance carrier. This visit was conducted with the patient's (and/or Waynard Cleaves guardian's) verbal consent. He has not had a related appointment within my department in the past 7 days or scheduled within the next 24 hours. The patient was located in a state where the provider was licensed to provide care. The patient was located at: Home: [address] [place] [address]  The provider was located at:  Facility (Appt Department): [id number] [id number]    Total Time: minutes: 21-30 minutes    James Patterson NP Yes

## 2023-05-01 ENCOUNTER — HOSPITAL ENCOUNTER (OUTPATIENT)
Dept: ULTRASOUND IMAGING | Age: 43
Discharge: HOME | End: 2023-05-01
Attending: NURSE PRACTITIONER
Payer: MEDICAID

## 2023-05-01 DIAGNOSIS — R10.9 FLANK PAIN: ICD-10-CM

## 2023-05-01 PROCEDURE — 76700 US EXAM ABDOM COMPLETE: CPT

## 2023-05-18 ENCOUNTER — TELEPHONE (OUTPATIENT)
Age: 43
End: 2023-05-18

## 2023-05-19 RX ORDER — HYDRALAZINE HYDROCHLORIDE 100 MG/1
100 TABLET, FILM COATED ORAL 3 TIMES DAILY PRN
Qty: 60 TABLET | Refills: 3 | Status: SHIPPED | OUTPATIENT
Start: 2023-05-19

## 2023-08-31 RX ORDER — OMEPRAZOLE 40 MG/1
CAPSULE, DELAYED RELEASE ORAL
Qty: 90 CAPSULE | Refills: 1 | Status: SHIPPED | OUTPATIENT
Start: 2023-08-31

## 2023-11-30 ENCOUNTER — OFFICE VISIT (OUTPATIENT)
Age: 43
End: 2023-11-30
Payer: MEDICAID

## 2023-11-30 VITALS
SYSTOLIC BLOOD PRESSURE: 120 MMHG | OXYGEN SATURATION: 100 % | DIASTOLIC BLOOD PRESSURE: 80 MMHG | TEMPERATURE: 97.9 F | WEIGHT: 238 LBS | RESPIRATION RATE: 18 BRPM | HEART RATE: 54 BPM | HEIGHT: 68 IN | BODY MASS INDEX: 36.07 KG/M2

## 2023-11-30 DIAGNOSIS — K21.9 GASTROESOPHAGEAL REFLUX DISEASE WITHOUT ESOPHAGITIS: ICD-10-CM

## 2023-11-30 DIAGNOSIS — S39.012D BACK STRAIN, SUBSEQUENT ENCOUNTER: ICD-10-CM

## 2023-11-30 DIAGNOSIS — I10 ESSENTIAL HYPERTENSION: Primary | ICD-10-CM

## 2023-11-30 LAB
BASOPHILS # BLD: 0 K/UL (ref 0–0.1)
BASOPHILS NFR BLD: 1 % (ref 0–1)
DIFFERENTIAL METHOD BLD: ABNORMAL
EOSINOPHIL # BLD: 0.1 K/UL (ref 0–0.4)
EOSINOPHIL NFR BLD: 1 % (ref 0–7)
ERYTHROCYTE [DISTWIDTH] IN BLOOD BY AUTOMATED COUNT: 16.4 % (ref 11.5–14.5)
HCT VFR BLD AUTO: 42.7 % (ref 36.6–50.3)
HGB BLD-MCNC: 13.3 G/DL (ref 12.1–17)
IMM GRANULOCYTES # BLD AUTO: 0 K/UL (ref 0–0.04)
IMM GRANULOCYTES NFR BLD AUTO: 0 % (ref 0–0.5)
LYMPHOCYTES # BLD: 1.7 K/UL (ref 0.8–3.5)
LYMPHOCYTES NFR BLD: 27 % (ref 12–49)
MCH RBC QN AUTO: 25.1 PG (ref 26–34)
MCHC RBC AUTO-ENTMCNC: 31.1 G/DL (ref 30–36.5)
MCV RBC AUTO: 80.6 FL (ref 80–99)
MONOCYTES # BLD: 0.5 K/UL (ref 0–1)
MONOCYTES NFR BLD: 8 % (ref 5–13)
NEUTS SEG # BLD: 4 K/UL (ref 1.8–8)
NEUTS SEG NFR BLD: 63 % (ref 32–75)
NRBC # BLD: 0 K/UL (ref 0–0.01)
NRBC BLD-RTO: 0 PER 100 WBC
PLATELET # BLD AUTO: 247 K/UL (ref 150–400)
PMV BLD AUTO: 12.2 FL (ref 8.9–12.9)
RBC # BLD AUTO: 5.3 M/UL (ref 4.1–5.7)
WBC # BLD AUTO: 6.3 K/UL (ref 4.1–11.1)

## 2023-11-30 PROCEDURE — 36415 COLL VENOUS BLD VENIPUNCTURE: CPT | Performed by: NURSE PRACTITIONER

## 2023-11-30 PROCEDURE — 99213 OFFICE O/P EST LOW 20 MIN: CPT | Performed by: NURSE PRACTITIONER

## 2023-11-30 PROCEDURE — 3079F DIAST BP 80-89 MM HG: CPT | Performed by: NURSE PRACTITIONER

## 2023-11-30 PROCEDURE — 3074F SYST BP LT 130 MM HG: CPT | Performed by: NURSE PRACTITIONER

## 2023-11-30 RX ORDER — OMEPRAZOLE 40 MG/1
CAPSULE, DELAYED RELEASE ORAL
Qty: 90 CAPSULE | Refills: 3 | Status: SHIPPED | OUTPATIENT
Start: 2023-11-30

## 2023-11-30 ASSESSMENT — PATIENT HEALTH QUESTIONNAIRE - PHQ9
1. LITTLE INTEREST OR PLEASURE IN DOING THINGS: 0
SUM OF ALL RESPONSES TO PHQ QUESTIONS 1-9: 0
SUM OF ALL RESPONSES TO PHQ QUESTIONS 1-9: 0
2. FEELING DOWN, DEPRESSED OR HOPELESS: 0
SUM OF ALL RESPONSES TO PHQ QUESTIONS 1-9: 0
SUM OF ALL RESPONSES TO PHQ QUESTIONS 1-9: 0
SUM OF ALL RESPONSES TO PHQ9 QUESTIONS 1 & 2: 0

## 2023-12-01 LAB
ALBUMIN SERPL-MCNC: 4.4 G/DL (ref 3.5–5)
ALBUMIN/GLOB SERPL: 1.3 (ref 1.1–2.2)
ALP SERPL-CCNC: 47 U/L (ref 45–117)
ALT SERPL-CCNC: 34 U/L (ref 12–78)
ANION GAP SERPL CALC-SCNC: 12 MMOL/L (ref 5–15)
AST SERPL-CCNC: 18 U/L (ref 15–37)
BILIRUB SERPL-MCNC: 0.4 MG/DL (ref 0.2–1)
BUN SERPL-MCNC: 18 MG/DL (ref 6–20)
BUN/CREAT SERPL: 17 (ref 12–20)
CALCIUM SERPL-MCNC: 9.1 MG/DL (ref 8.5–10.1)
CHLORIDE SERPL-SCNC: 103 MMOL/L (ref 97–108)
CHOLEST SERPL-MCNC: 201 MG/DL
CO2 SERPL-SCNC: 24 MMOL/L (ref 21–32)
CREAT SERPL-MCNC: 1.07 MG/DL (ref 0.7–1.3)
GLOBULIN SER CALC-MCNC: 3.4 G/DL (ref 2–4)
GLUCOSE SERPL-MCNC: 92 MG/DL (ref 65–100)
HDLC SERPL-MCNC: 101 MG/DL
HDLC SERPL: 2 (ref 0–5)
LDLC SERPL CALC-MCNC: 89 MG/DL (ref 0–100)
POTASSIUM SERPL-SCNC: 4 MMOL/L (ref 3.5–5.1)
PROT SERPL-MCNC: 7.8 G/DL (ref 6.4–8.2)
SODIUM SERPL-SCNC: 139 MMOL/L (ref 136–145)
TRIGL SERPL-MCNC: 55 MG/DL
VLDLC SERPL CALC-MCNC: 11 MG/DL

## 2023-12-01 NOTE — PROGRESS NOTES
Subjective:      Jerri Rodrigues is a 41 y.o. male who presents for follow up of low back problems. Current symptoms include: pain in upper back (pressure and tight band in character; 8/10 in severity) and stiffness in upper back . Symptoms have significantly worsened from the previous visit. Exacerbating factors identified by the patient are bending backwards, bending forwards, and recumbency. Past Medical History:   Diagnosis Date    HTN (hypertension)      Patient Active Problem List    Diagnosis Date Noted    Essential hypertension 10/25/2022    Gastroesophageal reflux disease without esophagitis 10/25/2022    Mitral valve disease 10/25/2022    Severe obesity ([address]) 05/06/2019     History reviewed. No pertinent surgical history. History reviewed. No pertinent family history. Social History     Socioeconomic History    Marital status: Single     Spouse name: None    Number of children: None    Years of education: None    Highest education level: None   Tobacco Use    Smoking status: Former     Packs/day: .5     Types: Cigarettes     Passive exposure: Current    Smokeless tobacco: Never   Vaping Use    Vaping Use: Never used   Substance and Sexual Activity    Alcohol use: Yes     Alcohol/week: 15.0 standard drinks of alcohol    Drug use: Yes     Types: Marijuana Graciela Rival)     Social Determinants of Health     Financial Resource Strain: Low Risk  (3/14/2023)    Overall Financial Resource Strain (CARDIA)     Difficulty of Paying Living Expenses: Not hard at all   Transportation Needs: Unknown (3/14/2023)    PRAPARE - Transportation     Lack of Transportation (Non-Medical):  No   Physical Activity: Insufficiently Active (3/14/2023)    Exercise Vital Sign     Days of Exercise per Week: 3 days     Minutes of Exercise per Session: 40 min   Stress: No Stress Concern Present (3/14/2023)    [address] [place] [address]     Feeling of Stress : Not at all   Social Connections:

## 2024-01-05 ENCOUNTER — OFFICE VISIT (OUTPATIENT)
Age: 44
End: 2024-01-05
Payer: MEDICAID

## 2024-01-05 VITALS
TEMPERATURE: 97.9 F | HEART RATE: 83 BPM | BODY MASS INDEX: 35.46 KG/M2 | OXYGEN SATURATION: 97 % | DIASTOLIC BLOOD PRESSURE: 82 MMHG | HEIGHT: 68 IN | SYSTOLIC BLOOD PRESSURE: 130 MMHG | WEIGHT: 234 LBS | RESPIRATION RATE: 18 BRPM

## 2024-01-05 DIAGNOSIS — R51.9 SINUS HEADACHE: Primary | ICD-10-CM

## 2024-01-05 DIAGNOSIS — K21.9 GASTROESOPHAGEAL REFLUX DISEASE WITHOUT ESOPHAGITIS: ICD-10-CM

## 2024-01-05 PROCEDURE — 3079F DIAST BP 80-89 MM HG: CPT

## 2024-01-05 PROCEDURE — 3075F SYST BP GE 130 - 139MM HG: CPT

## 2024-01-05 PROCEDURE — 99213 OFFICE O/P EST LOW 20 MIN: CPT

## 2024-01-05 RX ORDER — OMEPRAZOLE 40 MG/1
CAPSULE, DELAYED RELEASE ORAL
Qty: 90 CAPSULE | Refills: 3 | Status: SHIPPED | OUTPATIENT
Start: 2024-01-05

## 2024-01-05 RX ORDER — IBUPROFEN 800 MG/1
800 TABLET ORAL 2 TIMES DAILY PRN
Qty: 180 TABLET | Refills: 0 | Status: SHIPPED | OUTPATIENT
Start: 2024-01-05

## 2024-01-05 ASSESSMENT — PATIENT HEALTH QUESTIONNAIRE - PHQ9
1. LITTLE INTEREST OR PLEASURE IN DOING THINGS: 0
SUM OF ALL RESPONSES TO PHQ QUESTIONS 1-9: 0
SUM OF ALL RESPONSES TO PHQ9 QUESTIONS 1 & 2: 0
SUM OF ALL RESPONSES TO PHQ QUESTIONS 1-9: 0
2. FEELING DOWN, DEPRESSED OR HOPELESS: 0
SUM OF ALL RESPONSES TO PHQ QUESTIONS 1-9: 0
SUM OF ALL RESPONSES TO PHQ QUESTIONS 1-9: 0

## 2024-01-05 ASSESSMENT — ENCOUNTER SYMPTOMS
EYES NEGATIVE: 1
DIARRHEA: 0
SORE THROAT: 0
BLOOD IN STOOL: 0
COUGH: 0
SHORTNESS OF BREATH: 0
SINUS PRESSURE: 1
ALLERGIC/IMMUNOLOGIC NEGATIVE: 1
CONSTIPATION: 0
WHEEZING: 0
RESPIRATORY NEGATIVE: 1

## 2024-01-05 NOTE — PROGRESS NOTES
\"Have you been to the ER, urgent care clinic since your last visit?  Hospitalized since your last visit?\"    NO    “Have you seen or consulted any other health care providers outside of [facility] since your last visit?”    [facility] 2 wks ago     Chief Complaint   Patient presents with    Headache     Frontal x 2 wks left side pressure         Vitals:    01/05/24 1045   BP: 130/82   Pulse: 83   Resp: 18   Temp: 97.9 °F (36.6 °C)   SpO2: 97%              
  Skin: Negative.    Allergic/Immunologic: Negative.    Neurological:  Positive for headaches. Negative for dizziness.   Hematological: Negative.    Psychiatric/Behavioral: Negative.  Negative for dysphoric mood and sleep disturbance. The patient is not nervous/anxious.           Vitals:    01/05/24 1045   BP: 130/82   Site: Left Upper Arm   Position: Sitting   Cuff Size: Large Adult   Pulse: 83   Resp: 18   Temp: 97.9 °F (36.6 °C)   TempSrc: Oral   SpO2: 97%   Weight: 106.1 kg (234 lb)   Height: 1.727 m (5' 8\")        Wt Readings from Last 3 Encounters:   01/05/24 106.1 kg (234 lb)   11/30/23 108 kg (238 lb)   02/28/23 110.7 kg (244 lb)           1/5/2024    10:43 AM   PHQ-9    Little interest or pleasure in doing things 0   Feeling down, depressed, or hopeless 0   PHQ-2 Score 0   PHQ-9 Total Score 0        Physical Exam  Vitals and nursing note reviewed.   Constitutional:       General: He is not in acute distress.     Appearance: Normal appearance.   HENT:      Head: Normocephalic and atraumatic.      Ears:      Comments: Mild bilateral serous otitis with intact TMs  Eyes:      Extraocular Movements: Extraocular movements intact.      Conjunctiva/sclera: Conjunctivae normal.      Pupils: Pupils are equal, round, and reactive to light.   Cardiovascular:      Rate and Rhythm: Normal rate and regular rhythm.      Pulses: Normal pulses.      Heart sounds: Normal heart sounds. No murmur heard.     No friction rub. No gallop.   Pulmonary:      Effort: Pulmonary effort is normal. No respiratory distress.      Breath sounds: Normal breath sounds. No wheezing, rhonchi or rales.   Musculoskeletal:      Cervical back: Normal range of motion and neck supple.   Lymphadenopathy:      Cervical: No cervical adenopathy.   Skin:     General: Skin is warm and dry.   Neurological:      General: No focal deficit present.      Mental Status: He is alert and oriented to person, place, and time.   Psychiatric:         Mood and Affect:

## 2024-07-13 RX ORDER — LOSARTAN POTASSIUM AND HYDROCHLOROTHIAZIDE 25; 100 MG/1; MG/1
1 TABLET ORAL DAILY
Qty: 90 TABLET | Refills: 1 | Status: SHIPPED | OUTPATIENT
Start: 2024-07-13

## 2024-10-19 ENCOUNTER — HOSPITAL ENCOUNTER (OUTPATIENT)
Facility: HOSPITAL | Age: 44
Discharge: HOME | End: 2024-10-22
Payer: MEDICAID

## 2024-10-19 DIAGNOSIS — M25.572 ACUTE LEFT ANKLE PAIN: ICD-10-CM

## 2024-10-19 LAB — SENTARA SPECIMEN COLLECTION: NORMAL

## 2024-10-19 PROCEDURE — 99001 SPECIMEN HANDLING PT-LAB: CPT

## 2024-10-19 PROCEDURE — 73610 X-RAY EXAM OF ANKLE: CPT

## 2025-02-25 DIAGNOSIS — K21.9 GASTROESOPHAGEAL REFLUX DISEASE WITHOUT ESOPHAGITIS: ICD-10-CM

## 2025-02-26 RX ORDER — OMEPRAZOLE 40 MG/1
CAPSULE, DELAYED RELEASE ORAL
Qty: 90 CAPSULE | Refills: 3 | OUTPATIENT
Start: 2025-02-26

## 2025-03-27 DIAGNOSIS — K21.9 GASTROESOPHAGEAL REFLUX DISEASE WITHOUT ESOPHAGITIS: ICD-10-CM

## 2025-03-27 RX ORDER — OMEPRAZOLE 40 MG/1
CAPSULE, DELAYED RELEASE ORAL DAILY
Qty: 90 CAPSULE | Refills: 3 | OUTPATIENT
Start: 2025-03-27